# Patient Record
Sex: FEMALE | Race: WHITE | NOT HISPANIC OR LATINO | Employment: FULL TIME | URBAN - METROPOLITAN AREA
[De-identification: names, ages, dates, MRNs, and addresses within clinical notes are randomized per-mention and may not be internally consistent; named-entity substitution may affect disease eponyms.]

---

## 2017-07-24 ENCOUNTER — TELEPHONE (OUTPATIENT)
Dept: RADIOLOGY | Facility: HOSPITAL | Age: 47
End: 2017-07-24

## 2017-07-24 ENCOUNTER — OFFICE VISIT (OUTPATIENT)
Dept: OBSTETRICS AND GYNECOLOGY | Facility: CLINIC | Age: 47
End: 2017-07-24
Payer: COMMERCIAL

## 2017-07-24 VITALS
BODY MASS INDEX: 45.82 KG/M2 | WEIGHT: 249 LBS | DIASTOLIC BLOOD PRESSURE: 80 MMHG | SYSTOLIC BLOOD PRESSURE: 128 MMHG | HEIGHT: 62 IN

## 2017-07-24 DIAGNOSIS — Z01.419 PAP SMEAR, AS PART OF ROUTINE GYNECOLOGICAL EXAMINATION: ICD-10-CM

## 2017-07-24 DIAGNOSIS — N39.3 SUI (STRESS URINARY INCONTINENCE, FEMALE): ICD-10-CM

## 2017-07-24 DIAGNOSIS — G89.29 CHRONIC PELVIC PAIN IN FEMALE: ICD-10-CM

## 2017-07-24 DIAGNOSIS — Z86.010 PERSONAL HISTORY OF COLONIC POLYPS: ICD-10-CM

## 2017-07-24 DIAGNOSIS — Z12.39 BREAST CANCER SCREENING: ICD-10-CM

## 2017-07-24 DIAGNOSIS — R10.2 CHRONIC PELVIC PAIN IN FEMALE: ICD-10-CM

## 2017-07-24 DIAGNOSIS — Z01.419 WELL WOMAN EXAM WITH ROUTINE GYNECOLOGICAL EXAM: Primary | ICD-10-CM

## 2017-07-24 LAB
AMORPH CRY UR QL COMP ASSIST: ABNORMAL
BACTERIA #/AREA URNS AUTO: ABNORMAL /HPF
BILIRUB UR QL STRIP: NEGATIVE
CLARITY UR REFRACT.AUTO: ABNORMAL
COLOR UR AUTO: YELLOW
GLUCOSE UR QL STRIP: NEGATIVE
HGB UR QL STRIP: NEGATIVE
HYALINE CASTS UR QL AUTO: 0 /LPF
KETONES UR QL STRIP: NEGATIVE
LEUKOCYTE ESTERASE UR QL STRIP: NEGATIVE
MICROSCOPIC COMMENT: ABNORMAL
NITRITE UR QL STRIP: NEGATIVE
PH UR STRIP: 5 [PH] (ref 5–8)
PROT UR QL STRIP: ABNORMAL
RBC #/AREA URNS AUTO: 0 /HPF (ref 0–4)
SP GR UR STRIP: 1.02 (ref 1–1.03)
URN SPEC COLLECT METH UR: ABNORMAL
UROBILINOGEN UR STRIP-ACNC: NEGATIVE EU/DL
WBC #/AREA URNS AUTO: 0 /HPF (ref 0–5)

## 2017-07-24 PROCEDURE — 99999 PR PBB SHADOW E&M-EST. PATIENT-LVL IV: CPT | Mod: PBBFAC,,, | Performed by: OBSTETRICS & GYNECOLOGY

## 2017-07-24 PROCEDURE — 88175 CYTOPATH C/V AUTO FLUID REDO: CPT

## 2017-07-24 PROCEDURE — 87186 SC STD MICRODIL/AGAR DIL: CPT

## 2017-07-24 PROCEDURE — 87086 URINE CULTURE/COLONY COUNT: CPT

## 2017-07-24 PROCEDURE — 81001 URINALYSIS AUTO W/SCOPE: CPT

## 2017-07-24 PROCEDURE — 99386 PREV VISIT NEW AGE 40-64: CPT | Mod: S$GLB,,, | Performed by: OBSTETRICS & GYNECOLOGY

## 2017-07-24 PROCEDURE — 87088 URINE BACTERIA CULTURE: CPT

## 2017-07-24 PROCEDURE — 87077 CULTURE AEROBIC IDENTIFY: CPT

## 2017-07-24 RX ORDER — HYDROCHLOROTHIAZIDE 12.5 MG/1
12.5 TABLET ORAL
COMMUNITY
Start: 2017-06-05

## 2017-07-24 RX ORDER — BUPROPION HYDROCHLORIDE 300 MG/1
300 TABLET ORAL
COMMUNITY
Start: 2017-06-22 | End: 2018-11-15

## 2017-07-24 NOTE — PROGRESS NOTES
Subjective:       Patient ID: Radha Davlia is a 46 y.o. female.    Chief Complaint:  Gynecologic Exam      History of Present Illness  HPI  Presents for a well-woman exam, but also wants second opinion regarding pelvic pain.  Patient has a history of a BTL about 8 years ago, then underwent endometrial ablation 6 years ago for AUB.  States that last year, she started having cyclic episodes of pelvic pain.  She would get really bad pelvic cramps that radiated to her upper thighs and back.  It would last 1-2 weeks.  She tracked it, and noticed it occured in a cyclic, monthly fashion.  No bleeding or discharge during the pain episodes.  Was worked up by a doctor at [facility] and was told she had fluid in her endometrial cavity and hysterectomy was recommended.  She was also worked up by a urologist for stress urinary incontinence, and a sling procedure was planned as well.  The patient tried OCP's prior to this, but had some elevation in her bp on the pill.  She got very nervous about having surgery and wants a second opinion.  States she still has cyclic type cramping, but it is not severe as it was before.  She still leaks with cough or sneeze.  No dysuria or hematuria.   Last pap: thinks it was 2016 and normal  Last MMG: around 2016 and normal  Colonoscopy: several years ago and had a polyp;  Pt has a family history of colon cancer in her maternal grandfather, and her mother has had several colonic polyps removed    GYN & OB History  No LMP recorded. Patient has had an ablation.   Date of Last Pap: No result found    OB History    Para Term  AB Living   4 2 2   2 2   SAB TAB Ectopic Multiple Live Births   2              # Outcome Date GA Lbr Joe/2nd Weight Sex Delivery Anes PTL Lv   4 SAB            3 SAB            2 Term      CS-Unspec      1 Term      CS-Unspec             Review of Systems  Review of Systems   Constitutional: Negative for activity change, fatigue, fever and unexpected  weight change.   Gastrointestinal: Negative for abdominal pain, bloating, constipation, diarrhea, nausea and vomiting.   Endocrine: Negative for hair loss and hot flashes.   Genitourinary: Positive for pelvic pain and urinary incontinence (leaks with cough and sneeze). Negative for dyspareunia, dysuria, frequency, genital sores, hematuria, menorrhagia, menstrual problem, urgency, vaginal bleeding, vaginal discharge, vaginal pain, dysmenorrhea, postcoital bleeding and vaginal odor.   Skin:  Negative for rash and hair changes.   Hematological: Negative for adenopathy.   Breast: Negative for breast mass, breast pain, nipple discharge and skin changes          Objective:    Physical Exam:   Constitutional: She is oriented to person, place, and time. She appears well-developed and well-nourished. No distress.      Neck: Neck supple. No thyromegaly present.     Pulmonary/Chest: Right breast exhibits no inverted nipple, no mass, no nipple discharge, no skin change, no tenderness, no bleeding and no swelling. Left breast exhibits no inverted nipple, no mass, no nipple discharge, no skin change, no tenderness, no bleeding and no swelling. Breasts are symmetrical.        Abdominal: Soft. She exhibits no distension and no mass. There is no tenderness. There is no rebound and no guarding.     Genitourinary: Pelvic exam was performed with patient supine. There is no rash, tenderness, lesion or injury on the right labia. There is no rash, tenderness, lesion or injury on the left labia. Uterus is tender. Uterus is not deviated, not enlarged, not fixed, not hosting fibroids and not experiencing uterine prolapse. Cervix is normal. Right adnexum displays tenderness. Right adnexum displays no mass and no fullness. Left adnexum displays tenderness. Left adnexum displays no mass and no fullness. No erythema, tenderness, bleeding, rectocele or cystocele in the vagina. No foreign body in the vagina. No signs of injury around the vagina.  No vaginal discharge found. Cervix exhibits no motion tenderness, no discharge and no friability. Additional cervical findings: pap smear done  Genitourinary Comments: Mild, diffuse pelvic tenderness noted on exam  No cough stress test done today        Uterus Size: 6 cm      Lymphadenopathy:        Right: No inguinal adenopathy present.        Left: No inguinal adenopathy present.    Neurological: She is alert and oriented to person, place, and time.     Psychiatric: She has a normal mood and affect.          Assessment:        1. Well woman exam with routine gynecological exam    2. Chronic pelvic pain in female    3. Personal history of colonic polyps    4. Pap smear, as part of routine gynecological examination    5. Breast cancer screening    6. IVANNA (stress urinary incontinence, female)                Plan:      Radha was seen today for gynecologic exam.    Diagnoses and all orders for this visit:    Well woman exam with routine gynecological exam    Chronic pelvic pain in female  -     US Pelvis Complete Non OB; Future    Personal history of colonic polyps  -     Ambulatory Referral to Gastroenterology    Pap smear, as part of routine gynecological examination  -     Liquid-based pap smear, screening    Breast cancer screening  -     Mammo Digital Screening Bilat with CAD; Future    IVANNA (stress urinary incontinence, female)  -     Urinalysis  -     Urine culture    Reviewed my clinical suspicion for post ablation/tubal ligation syndrome as the cause of her cyclic type pelvic pain.  Explained that we could try non-estrogen containing hormonal management such as progesterone only OCP's or depo provera to see if she gets symptom relief.  Discussed that often, patients ultimately require hysterectomy with bilateral salpingectomy for symptom relief.  Patient hesitant to try depo provera b/c of concerns about weight gain.  As for her IVANNA, records were requested to see what sort of work-up was done.  If pt ultimately  opts for surgery, she will need to be evaluated by me with a full bladder for a cough stress test.  Discussed Kegel exercises with the patient.  Reviewed updated recommendations for pap smears (every 3 years) in low risk patients.   Recommend annual pelvic exams.  Reviewed recommendations for annual CBE and annual MMG.  Will call pt with results of above and see how she wants to proceed.

## 2017-07-25 ENCOUNTER — HOSPITAL ENCOUNTER (OUTPATIENT)
Dept: RADIOLOGY | Facility: HOSPITAL | Age: 47
Discharge: HOME | End: 2017-07-25
Attending: OBSTETRICS & GYNECOLOGY
Payer: COMMERCIAL

## 2017-07-25 VITALS — BODY MASS INDEX: 45.8 KG/M2 | WEIGHT: 248.88 LBS | HEIGHT: 62 IN

## 2017-07-25 DIAGNOSIS — G89.29 CHRONIC PELVIC PAIN IN FEMALE: ICD-10-CM

## 2017-07-25 DIAGNOSIS — Z12.39 BREAST CANCER SCREENING: ICD-10-CM

## 2017-07-25 DIAGNOSIS — R10.2 CHRONIC PELVIC PAIN IN FEMALE: ICD-10-CM

## 2017-07-25 PROCEDURE — 77067 SCR MAMMO BI INCL CAD: CPT | Mod: TC

## 2017-07-25 PROCEDURE — 76830 TRANSVAGINAL US NON-OB: CPT | Mod: 26,,, | Performed by: RADIOLOGY

## 2017-07-25 PROCEDURE — 77063 BREAST TOMOSYNTHESIS BI: CPT | Mod: 26,,, | Performed by: RADIOLOGY

## 2017-07-25 PROCEDURE — 76856 US EXAM PELVIC COMPLETE: CPT | Mod: TC,PO

## 2017-07-25 PROCEDURE — 76856 US EXAM PELVIC COMPLETE: CPT | Mod: 26,,, | Performed by: RADIOLOGY

## 2017-07-25 PROCEDURE — 77067 SCR MAMMO BI INCL CAD: CPT | Mod: 26,,, | Performed by: RADIOLOGY

## 2017-07-27 LAB — BACTERIA UR CULT: NORMAL

## 2017-07-28 ENCOUNTER — PATIENT MESSAGE (OUTPATIENT)
Dept: OBSTETRICS AND GYNECOLOGY | Facility: CLINIC | Age: 47
End: 2017-07-28

## 2017-07-28 DIAGNOSIS — N30.00 ACUTE CYSTITIS WITHOUT HEMATURIA: Primary | ICD-10-CM

## 2017-07-28 RX ORDER — SULFAMETHOXAZOLE AND TRIMETHOPRIM 800; 160 MG/1; MG/1
1 TABLET ORAL 2 TIMES DAILY
Qty: 6 TABLET | Refills: 0 | Status: SHIPPED | OUTPATIENT
Start: 2017-07-28 | End: 2017-07-31

## 2017-10-04 ENCOUNTER — TELEPHONE (OUTPATIENT)
Dept: FAMILY MEDICINE | Facility: CLINIC | Age: 47
End: 2017-10-04

## 2017-10-04 DIAGNOSIS — Z86.010 HISTORY OF COLON POLYPS: Primary | ICD-10-CM

## 2017-10-04 NOTE — TELEPHONE ENCOUNTER
She wants it in [facility].I have signed for the following orders AND/OR meds.  Please call the patient and ask the patient to schedule the testing AND/OR inform about any medications that were sent.      Orders Placed This Encounter   Procedures    Case request GI: COLONOSCOPY     Order Specific Question:   Pre-op Diagnosis     Answer:   History of colon polyps [113600]     Order Specific Question:   CPT Code:     Answer:   IN INTERVAL >=3 YRS PATIENT'S LAST COLONOSCOPY DOCUMENTED [0529F]     Order Specific Question:   Case Referring Provider     Answer:   ROSA DIAZ [[id number]]

## 2017-10-04 NOTE — TELEPHONE ENCOUNTER
----- Message from Pita Young sent at 10/4/2017 10:38 AM CDT -----  Contact: pita Toney I please get an order for pt to have a colon.for hx of colon polyps.  Please advise

## 2017-10-10 ENCOUNTER — PATIENT MESSAGE (OUTPATIENT)
Dept: FAMILY MEDICINE | Facility: CLINIC | Age: 47
End: 2017-10-10

## 2018-01-02 ENCOUNTER — TELEPHONE (OUTPATIENT)
Dept: OBSTETRICS AND GYNECOLOGY | Facility: CLINIC | Age: 48
End: 2018-01-02

## 2018-01-02 NOTE — TELEPHONE ENCOUNTER
----- Message from Geraldo Banks sent at 1/2/2018 11:55 AM CST -----  Contact: Pt   States she's calling rg having lumps in vaginal area and wants to discuss and can be reached at [phone]//thanks/dbw

## 2018-01-02 NOTE — TELEPHONE ENCOUNTER
Pt states that she has bumps on her vagina that are itching. Pt requesting to be seen. Scheduled pt to see Sandra Yun Np 1/4/17 at 1045.

## 2018-01-02 NOTE — TELEPHONE ENCOUNTER
----- Message from Radha Hodgson sent at 1/2/2018 12:32 PM CST -----  Contact: pt   Pt returning nurses call,,, please call pt back at [phone] (m)

## 2018-09-20 ENCOUNTER — TELEPHONE (OUTPATIENT)
Dept: OBSTETRICS AND GYNECOLOGY | Facility: CLINIC | Age: 48
End: 2018-09-20

## 2018-09-20 DIAGNOSIS — Z12.39 BREAST CANCER SCREENING: Primary | ICD-10-CM

## 2018-09-20 NOTE — TELEPHONE ENCOUNTER
----- Message from Mayte Eaton sent at 9/20/2018 12:28 PM CDT -----  Contact: PT   Pt need orders in for Mammo.    [phone] (home)

## 2018-09-20 NOTE — TELEPHONE ENCOUNTER
mammogram order placed per written order guideline. Attempted to call pt, no answer, unable to leave message. Will try again later.

## 2018-09-24 ENCOUNTER — OFFICE VISIT (OUTPATIENT)
Dept: OBSTETRICS AND GYNECOLOGY | Facility: CLINIC | Age: 48
End: 2018-09-24
Payer: COMMERCIAL

## 2018-09-24 ENCOUNTER — HOSPITAL ENCOUNTER (OUTPATIENT)
Dept: RADIOLOGY | Facility: HOSPITAL | Age: 48
Discharge: HOME | End: 2018-09-24
Attending: OBSTETRICS & GYNECOLOGY
Payer: COMMERCIAL

## 2018-09-24 VITALS
WEIGHT: 255.5 LBS | HEIGHT: 62 IN | DIASTOLIC BLOOD PRESSURE: 86 MMHG | SYSTOLIC BLOOD PRESSURE: 136 MMHG | BODY MASS INDEX: 47.02 KG/M2

## 2018-09-24 VITALS — WEIGHT: 255 LBS | HEIGHT: 62 IN | BODY MASS INDEX: 46.93 KG/M2

## 2018-09-24 DIAGNOSIS — N30.01 ACUTE CYSTITIS WITH HEMATURIA: ICD-10-CM

## 2018-09-24 DIAGNOSIS — B37.31 VULVOVAGINAL CANDIDIASIS: ICD-10-CM

## 2018-09-24 DIAGNOSIS — Z12.11 COLON CANCER SCREENING: ICD-10-CM

## 2018-09-24 DIAGNOSIS — L90.0 LICHEN SCLEROSUS: ICD-10-CM

## 2018-09-24 DIAGNOSIS — Z12.39 BREAST CANCER SCREENING: ICD-10-CM

## 2018-09-24 DIAGNOSIS — L29.2 VULVAR PRURITUS: ICD-10-CM

## 2018-09-24 DIAGNOSIS — Z01.419 WELL WOMAN EXAM WITH ROUTINE GYNECOLOGICAL EXAM: Primary | ICD-10-CM

## 2018-09-24 PROCEDURE — 3075F SYST BP GE 130 - 139MM HG: CPT | Mod: CPTII,S$GLB,, | Performed by: OBSTETRICS & GYNECOLOGY

## 2018-09-24 PROCEDURE — 99999 PR PBB SHADOW E&M-EST. PATIENT-LVL III: CPT | Mod: PBBFAC,,, | Performed by: OBSTETRICS & GYNECOLOGY

## 2018-09-24 PROCEDURE — 3079F DIAST BP 80-89 MM HG: CPT | Mod: CPTII,S$GLB,, | Performed by: OBSTETRICS & GYNECOLOGY

## 2018-09-24 PROCEDURE — 77063 BREAST TOMOSYNTHESIS BI: CPT | Mod: TC,PO

## 2018-09-24 PROCEDURE — 99396 PREV VISIT EST AGE 40-64: CPT | Mod: 25,S$GLB,, | Performed by: OBSTETRICS & GYNECOLOGY

## 2018-09-24 PROCEDURE — 87086 URINE CULTURE/COLONY COUNT: CPT

## 2018-09-24 PROCEDURE — 87210 SMEAR WET MOUNT SALINE/INK: CPT | Mod: QW,S$GLB,, | Performed by: OBSTETRICS & GYNECOLOGY

## 2018-09-24 PROCEDURE — 87088 URINE BACTERIA CULTURE: CPT

## 2018-09-24 PROCEDURE — 77063 BREAST TOMOSYNTHESIS BI: CPT | Mod: 26,,, | Performed by: RADIOLOGY

## 2018-09-24 PROCEDURE — 87077 CULTURE AEROBIC IDENTIFY: CPT

## 2018-09-24 PROCEDURE — 87186 SC STD MICRODIL/AGAR DIL: CPT

## 2018-09-24 PROCEDURE — 77067 SCR MAMMO BI INCL CAD: CPT | Mod: 26,,, | Performed by: RADIOLOGY

## 2018-09-24 RX ORDER — CLOBETASOL PROPIONATE 0.5 MG/G
OINTMENT TOPICAL DAILY PRN
Qty: 60 G | Refills: 3 | Status: SHIPPED | OUTPATIENT
Start: 2018-09-24 | End: 2018-10-24

## 2018-09-24 RX ORDER — FLUCONAZOLE 150 MG/1
150 TABLET ORAL ONCE
Qty: 1 TABLET | Refills: 0 | Status: SHIPPED | OUTPATIENT
Start: 2018-09-24 | End: 2018-09-24

## 2018-09-24 RX ORDER — SULFAMETHOXAZOLE AND TRIMETHOPRIM 800; 160 MG/1; MG/1
1 TABLET ORAL 2 TIMES DAILY
Qty: 6 TABLET | Refills: 0 | Status: SHIPPED | OUTPATIENT
Start: 2018-09-24 | End: 2018-09-27

## 2018-09-24 NOTE — PROGRESS NOTES
Subjective:       Patient ID: Radha Davila is a 48 y.o. female.    Chief Complaint:  Annual Exam      History of Present Illness  HPI  Presents for well-woman exam.  Complains of dysuria and midline pelvic discomfort when voiding along with vulvovaginal itching.  Patient with known lichen sclerosus by biopsy, and uses clobetasol ointment sparingly as needed for flare-ups.  Needs refill.  She has a hx of endometrial ablation and BTL.  She was having issues with severe cyclic pelvic pain, but that has pretty much resolved.     Last pap: 2017: normal  Doing screening MMG today: no breast compaints  Needs referral for screening colonoscopy.    GYN & OB History  No LMP recorded. Patient has had an ablation.   Date of Last Pap: 2017    OB History    Para Term  AB Living   4 2 2   2 2   SAB TAB Ectopic Multiple Live Births   2              # Outcome Date GA Lbr Joe/2nd Weight Sex Delivery Anes PTL Lv   4 SAB            3 SAB            2 Term      CS-Unspec      1 Term      CS-Unspec             Review of Systems  Review of Systems   Constitutional: Negative for activity change, fatigue, fever and unexpected weight change.   Gastrointestinal: Negative for abdominal pain, bloating, constipation, diarrhea, nausea and vomiting.   Endocrine: Negative for hair loss and hot flashes.   Genitourinary: Positive for dysuria, frequency, pelvic pain (midline pelvic discomfort when voiding), vaginal pain (itching) and vaginal odor. Negative for dyspareunia, genital sores, hematuria, menorrhagia, menstrual problem, urgency, vaginal bleeding, vaginal discharge, dysmenorrhea and postcoital bleeding.   Skin:  Negative for rash and hair changes.   Hematological: Negative for adenopathy.   Breast: Negative for breast mass, breast pain, nipple discharge and skin changes          Objective:    Physical Exam:   Constitutional: She is oriented to person, place, and time. She appears well-developed and well-nourished.  No distress.      Neck: Neck supple. No thyromegaly present.     Pulmonary/Chest: Right breast exhibits no inverted nipple, no mass, no nipple discharge, no skin change, no tenderness, no bleeding and no swelling. Left breast exhibits no inverted nipple, no mass, no nipple discharge, no skin change, no tenderness, no bleeding and no swelling. Breasts are symmetrical.        Abdominal: Soft. She exhibits no distension and no mass. There is tenderness in the suprapubic area. There is no rebound and no guarding.   obese     Genitourinary: Pelvic exam was performed with patient supine. There is rash (erythematous maculopapular rash with satellite lesions c/w yeast) on the right labia. There is no tenderness, lesion or injury on the right labia. There is rash on the left labia. There is no tenderness, lesion or injury on the left labia. Uterus is not deviated, not enlarged, not fixed, not tender, not hosting fibroids and not experiencing uterine prolapse. Cervix is normal. Right adnexum displays no mass, no tenderness and no fullness. Left adnexum displays no mass, no tenderness and no fullness. There is erythema in the vagina. No tenderness, bleeding, rectocele or cystocele in the vagina. No foreign body in the vagina. No signs of injury around the vagina. No vaginal discharge found. Cervix exhibits no motion tenderness, no discharge and no friability.        Uterus Size: 6 cm      Lymphadenopathy:        Right: No inguinal adenopathy present.        Left: No inguinal adenopathy present.    Neurological: She is alert and oriented to person, place, and time.     Psychiatric: She has a normal mood and affect.        urine dip: 2+ protein, + blood  Wet prep: yeast noted  Assessment:        1. Well woman exam with routine gynecological exam    2. Lichen sclerosus    3. Acute cystitis with hematuria    4. Vulvar pruritus    5. Vulvovaginal candidiasis    6. Colon cancer screening                Plan:      Radha was seen today  for annual exam.    Diagnoses and all orders for this visit:    Well woman exam with routine gynecological exam    Lichen sclerosus  -     clobetasol 0.05% (TEMOVATE) 0.05 % Oint; Apply topically daily as needed. For vulvar irritation    Acute cystitis with hematuria  -     sulfamethoxazole-trimethoprim 800-160mg (BACTRIM DS) 800-160 mg Tab; Take 1 tablet by mouth 2 (two) times daily. for 3 days  -     Urine culture    Vulvar pruritus  -     clobetasol 0.05% (TEMOVATE) 0.05 % Oint; Apply topically daily as needed. For vulvar irritation    Vulvovaginal candidiasis  -     fluconazole (DIFLUCAN) 150 MG Tab; Take 1 tablet (150 mg total) by mouth once. for 1 dose    Colon cancer screening  -     Case request GI: COLONOSCOPY    Reviewed updated recommendations for pap smears (every 3 years) in low risk patients.   Recommend annual pelvic exams.  Reviewed recommendations for annual CBE and annual MMG.  RTC 1 year or sooner prn.

## 2018-09-26 ENCOUNTER — PATIENT MESSAGE (OUTPATIENT)
Dept: OBSTETRICS AND GYNECOLOGY | Facility: CLINIC | Age: 48
End: 2018-09-26

## 2018-09-27 ENCOUNTER — LAB VISIT (OUTPATIENT)
Dept: LAB | Facility: HOSPITAL | Age: 48
End: 2018-09-27
Attending: INTERNAL MEDICINE
Payer: COMMERCIAL

## 2018-09-27 ENCOUNTER — OFFICE VISIT (OUTPATIENT)
Dept: RHEUMATOLOGY | Facility: CLINIC | Age: 48
End: 2018-09-27
Payer: COMMERCIAL

## 2018-09-27 ENCOUNTER — TELEPHONE (OUTPATIENT)
Dept: OBSTETRICS AND GYNECOLOGY | Facility: CLINIC | Age: 48
End: 2018-09-27

## 2018-09-27 ENCOUNTER — TELEPHONE (OUTPATIENT)
Dept: RHEUMATOLOGY | Facility: CLINIC | Age: 48
End: 2018-09-27

## 2018-09-27 VITALS
HEIGHT: 62 IN | DIASTOLIC BLOOD PRESSURE: 100 MMHG | HEART RATE: 88 BPM | SYSTOLIC BLOOD PRESSURE: 152 MMHG | BODY MASS INDEX: 46.38 KG/M2 | WEIGHT: 252 LBS

## 2018-09-27 DIAGNOSIS — E06.3 HYPOTHYROIDISM DUE TO HASHIMOTO'S THYROIDITIS: ICD-10-CM

## 2018-09-27 DIAGNOSIS — M35.9 UNDIFFERENTIATED CONNECTIVE TISSUE DISEASE: ICD-10-CM

## 2018-09-27 DIAGNOSIS — N30.01 ACUTE CYSTITIS WITH HEMATURIA: Primary | ICD-10-CM

## 2018-09-27 DIAGNOSIS — R06.09 DYSPNEA ON EXERTION: Primary | ICD-10-CM

## 2018-09-27 DIAGNOSIS — M35.9 UNDIFFERENTIATED CONNECTIVE TISSUE DISEASE: Primary | ICD-10-CM

## 2018-09-27 DIAGNOSIS — E03.8 HYPOTHYROIDISM DUE TO HASHIMOTO'S THYROIDITIS: ICD-10-CM

## 2018-09-27 DIAGNOSIS — B99.9 RECURRENT INFECTIONS: ICD-10-CM

## 2018-09-27 DIAGNOSIS — N30.01 ACUTE CYSTITIS WITH HEMATURIA: ICD-10-CM

## 2018-09-27 LAB
ALBUMIN SERPL BCP-MCNC: 4.1 G/DL
ALP SERPL-CCNC: 106 U/L
ALT SERPL W/O P-5'-P-CCNC: 18 U/L
ANION GAP SERPL CALC-SCNC: 8 MMOL/L
AST SERPL-CCNC: 14 U/L
BACTERIA UR CULT: NORMAL
BASOPHILS # BLD AUTO: 0.04 K/UL
BASOPHILS NFR BLD: 0.4 %
BILIRUB SERPL-MCNC: 0.4 MG/DL
BILIRUB UR QL STRIP: NEGATIVE
BUN SERPL-MCNC: 11 MG/DL
C3 SERPL-MCNC: 159 MG/DL
C4 SERPL-MCNC: 47 MG/DL
CALCIUM SERPL-MCNC: 9.9 MG/DL
CCP AB SER IA-ACNC: <0.5 U/ML
CHLORIDE SERPL-SCNC: 108 MMOL/L
CK SERPL-CCNC: 73 U/L
CLARITY UR: CLEAR
CO2 SERPL-SCNC: 23 MMOL/L
COLOR UR: YELLOW
CREAT SERPL-MCNC: 0.8 MG/DL
CREAT UR-MCNC: 136 MG/DL
CRP SERPL-MCNC: 3.8 MG/L
DIFFERENTIAL METHOD: NORMAL
EOSINOPHIL # BLD AUTO: 0.5 K/UL
EOSINOPHIL NFR BLD: 4.6 %
ERYTHROCYTE [DISTWIDTH] IN BLOOD BY AUTOMATED COUNT: 13.7 %
ERYTHROCYTE [SEDIMENTATION RATE] IN BLOOD BY WESTERGREN METHOD: 12 MM/HR
EST. GFR  (AFRICAN AMERICAN): >60 ML/MIN/1.73 M^2
EST. GFR  (NON AFRICAN AMERICAN): >60 ML/MIN/1.73 M^2
GLUCOSE SERPL-MCNC: 101 MG/DL
GLUCOSE UR QL STRIP: NEGATIVE
HCT VFR BLD AUTO: 42 %
HGB BLD-MCNC: 14.2 G/DL
HGB UR QL STRIP: ABNORMAL
IGA SERPL-MCNC: 261 MG/DL
IGE SERPL-ACNC: <35 IU/ML
IGG SERPL-MCNC: 1127 MG/DL
IGM SERPL-MCNC: 123 MG/DL
KETONES UR QL STRIP: NEGATIVE
LEUKOCYTE ESTERASE UR QL STRIP: NEGATIVE
LYMPHOCYTES # BLD AUTO: 2.2 K/UL
LYMPHOCYTES NFR BLD: 21.7 %
MCH RBC QN AUTO: 28.5 PG
MCHC RBC AUTO-ENTMCNC: 33.8 G/DL
MCV RBC AUTO: 84 FL
MONOCYTES # BLD AUTO: 0.7 K/UL
MONOCYTES NFR BLD: 7.2 %
NEUTROPHILS # BLD AUTO: 6.7 K/UL
NEUTROPHILS NFR BLD: 66.1 %
NITRITE UR QL STRIP: NEGATIVE
PH UR STRIP: 6 [PH] (ref 5–8)
PLATELET # BLD AUTO: 283 K/UL
PMV BLD AUTO: 10.2 FL
POTASSIUM SERPL-SCNC: 4.3 MMOL/L
PROT SERPL-MCNC: 7.8 G/DL
PROT UR QL STRIP: ABNORMAL
PROT UR-MCNC: 29 MG/DL
PROT/CREAT UR: 0.21 MG/G{CREAT}
RBC # BLD AUTO: 4.98 M/UL
RHEUMATOID FACT SERPL-ACNC: <10 IU/ML
SODIUM SERPL-SCNC: 139 MMOL/L
SP GR UR STRIP: >=1.03 (ref 1–1.03)
URN SPEC COLLECT METH UR: ABNORMAL
WBC # BLD AUTO: 10.06 K/UL

## 2018-09-27 PROCEDURE — 86161 COMPLEMENT/FUNCTION ACTIVITY: CPT

## 2018-09-27 PROCEDURE — 87088 URINE BACTERIA CULTURE: CPT

## 2018-09-27 PROCEDURE — 82085 ASSAY OF ALDOLASE: CPT

## 2018-09-27 PROCEDURE — 86334 IMMUNOFIX E-PHORESIS SERUM: CPT

## 2018-09-27 PROCEDURE — 83516 IMMUNOASSAY NONANTIBODY: CPT | Mod: 59

## 2018-09-27 PROCEDURE — 84165 PROTEIN E-PHORESIS SERUM: CPT | Mod: 26,,, | Performed by: PATHOLOGY

## 2018-09-27 PROCEDURE — 86431 RHEUMATOID FACTOR QUANT: CPT

## 2018-09-27 PROCEDURE — 81003 URINALYSIS AUTO W/O SCOPE: CPT | Mod: PO

## 2018-09-27 PROCEDURE — 86235 NUCLEAR ANTIGEN ANTIBODY: CPT

## 2018-09-27 PROCEDURE — 84165 PROTEIN E-PHORESIS SERUM: CPT

## 2018-09-27 PROCEDURE — 86235 NUCLEAR ANTIGEN ANTIBODY: CPT | Mod: 59

## 2018-09-27 PROCEDURE — 85025 COMPLETE CBC W/AUTO DIFF WBC: CPT | Mod: PO

## 2018-09-27 PROCEDURE — 3077F SYST BP >= 140 MM HG: CPT | Mod: CPTII,S$GLB,, | Performed by: INTERNAL MEDICINE

## 2018-09-27 PROCEDURE — 86200 CCP ANTIBODY: CPT

## 2018-09-27 PROCEDURE — 82570 ASSAY OF URINE CREATININE: CPT

## 2018-09-27 PROCEDURE — 86355 B CELLS TOTAL COUNT: CPT

## 2018-09-27 PROCEDURE — 85651 RBC SED RATE NONAUTOMATED: CPT | Mod: PO

## 2018-09-27 PROCEDURE — 80053 COMPREHEN METABOLIC PANEL: CPT | Mod: PO

## 2018-09-27 PROCEDURE — 3080F DIAST BP >= 90 MM HG: CPT | Mod: CPTII,S$GLB,, | Performed by: INTERNAL MEDICINE

## 2018-09-27 PROCEDURE — 83520 IMMUNOASSAY QUANT NOS NONAB: CPT | Mod: 59

## 2018-09-27 PROCEDURE — 86360 T CELL ABSOLUTE COUNT/RATIO: CPT

## 2018-09-27 PROCEDURE — 86160 COMPLEMENT ANTIGEN: CPT | Mod: 59

## 2018-09-27 PROCEDURE — 82785 ASSAY OF IGE: CPT

## 2018-09-27 PROCEDURE — 82550 ASSAY OF CK (CPK): CPT | Mod: PO

## 2018-09-27 PROCEDURE — 87086 URINE CULTURE/COLONY COUNT: CPT

## 2018-09-27 PROCEDURE — 86334 IMMUNOFIX E-PHORESIS SERUM: CPT | Mod: 26,,, | Performed by: PATHOLOGY

## 2018-09-27 PROCEDURE — 99999 PR PBB SHADOW E&M-EST. PATIENT-LVL III: CPT | Mod: PBBFAC,,, | Performed by: INTERNAL MEDICINE

## 2018-09-27 PROCEDURE — 87186 SC STD MICRODIL/AGAR DIL: CPT

## 2018-09-27 PROCEDURE — 86359 T CELLS TOTAL COUNT: CPT

## 2018-09-27 PROCEDURE — 3008F BODY MASS INDEX DOCD: CPT | Mod: CPTII,S$GLB,, | Performed by: INTERNAL MEDICINE

## 2018-09-27 PROCEDURE — 86162 COMPLEMENT TOTAL (CH50): CPT

## 2018-09-27 PROCEDURE — 87077 CULTURE AEROBIC IDENTIFY: CPT

## 2018-09-27 PROCEDURE — 86160 COMPLEMENT ANTIGEN: CPT

## 2018-09-27 PROCEDURE — 86357 NK CELLS TOTAL COUNT: CPT

## 2018-09-27 PROCEDURE — 82784 ASSAY IGA/IGD/IGG/IGM EACH: CPT | Mod: 59

## 2018-09-27 PROCEDURE — 86140 C-REACTIVE PROTEIN: CPT

## 2018-09-27 PROCEDURE — 83520 IMMUNOASSAY QUANT NOS NONAB: CPT

## 2018-09-27 PROCEDURE — 86038 ANTINUCLEAR ANTIBODIES: CPT

## 2018-09-27 PROCEDURE — 36415 COLL VENOUS BLD VENIPUNCTURE: CPT | Mod: PO

## 2018-09-27 PROCEDURE — 99205 OFFICE O/P NEW HI 60 MIN: CPT | Mod: S$GLB,,, | Performed by: INTERNAL MEDICINE

## 2018-09-27 RX ORDER — LEVOTHYROXINE SODIUM 25 UG/1
25 TABLET ORAL DAILY
Qty: 30 TABLET | Refills: 11 | Status: SHIPPED | OUTPATIENT
Start: 2018-09-27 | End: 2018-11-15

## 2018-09-27 RX ORDER — AMOXICILLIN AND CLAVULANATE POTASSIUM 875; 125 MG/1; MG/1
1 TABLET, FILM COATED ORAL EVERY 12 HOURS
Qty: 14 TABLET | Refills: 0 | Status: SHIPPED | OUTPATIENT
Start: 2018-09-27 | End: 2018-10-04

## 2018-09-27 NOTE — ASSESSMENT & PLAN NOTE
Recurrent infections associated with mucocutaneous ulcers, cystitis and nasopharyngeal it is.  Check immunoglobulin levels to rule out any underlying immunodeficiency disorder.

## 2018-09-27 NOTE — ASSESSMENT & PLAN NOTE
Hypothyroidism associated with Hashimoto's thyroiditis with severe chronic fatigue and inability to lose weight.  Start low-dose levothyroxine 25 mcg once daily.  Advised all precautions before taking thyroxine tablets

## 2018-09-27 NOTE — ASSESSMENT & PLAN NOTE
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  Check serologies to see whether her autoimmune disease have evolved in the last 8 years.  Consider initiation of hydroxychloroquine therapy once serologies return.

## 2018-09-27 NOTE — ASSESSMENT & PLAN NOTE
Have completed antibiotics given by her primary care physician.  She still has dysuria.  Repeat UA with urine culture.

## 2018-09-27 NOTE — TELEPHONE ENCOUNTER
Jennifer Retana Staff               Ct of chest scheduled 9-28                                                              Good Morning,                                                                                This case is pending through [facility] and needs a peer to peer . Please call :[phone] for a peer to peer; the patient's demographics can be used for identification. Please IM or message for urgent questions and concerns.                                                                Jennifer Heath

## 2018-09-27 NOTE — PROGRESS NOTES
RHEUMATOLOGY CLINIC INITIAL VISIT  Chief complaints:-  I am here to get checked for any autoimmune disease since I get recurrent infections and pain all over the body.    HPI:-  Radha Christiansen a 48 y.o. pleasant female comes in for an initial visit with above chief complaints.  She reports having recurrent infections in the past 5 years associated with tennis over angiitis, bladder infections, mucocutaneous ulcers associated with severe fatigue, joint pains, muscle pain and weakness.  She was diagnosed with some autoimmune disorder several years ago which she is not sure.  She followed up with the rheumatologist in [place] at that time and was told not to worry about autoimmune disease since it was very mild.  She was also diagnosed with multiple other problems as reported in the past medical history without any clear underlying cause in the last several years.  She has history of photosensitive malar rash, sicca syndrome, recurrent mucocutaneous ulcers.  She denies any history of treatment resistant headaches, seizures or psychosis.  She complains of both mechanical and inflammatory type of pain over her several small and large joints.  Review of Systems   Constitutional: Positive for malaise/fatigue. Negative for chills and fever.   HENT: Negative for congestion and sore throat.    Eyes: Negative for blurred vision and redness.   Respiratory: Positive for shortness of breath. Negative for cough.    Cardiovascular: Negative for chest pain and leg swelling.   Gastrointestinal: Negative for abdominal pain.   Genitourinary: Positive for dysuria.   Musculoskeletal: Positive for back pain, joint pain, myalgias and neck pain. Negative for falls.   Skin: Negative for rash.   Neurological: Positive for tingling and sensory change. Negative for headaches.   Endo/Heme/Allergies: Does not bruise/bleed easily.   Psychiatric/Behavioral: Positive for depression.  "Negative for memory loss. The patient is nervous/anxious and has insomnia.        Past Medical History:   Diagnosis Date    Anemia     Anxiety     Fibrocystic breast     Hypertension     Infertility, female     Insulin resistance     Lichen sclerosus     Molar pregnancy     Multinodular goiter 2012    Thyroid disease     Ulcer        Past Surgical History:   Procedure Laterality Date     SECTION      x2    CHOLECYSTECTOMY      COLONOSCOPY W/ POLYPECTOMY  4/3/2012    DILATION AND CURETTAGE OF UTERUS      x2    ENDOMETRIAL ABLATION      ND UPPER GI ENDOSCOPY,BIOPSY  4/3/2012         TUBAL LIGATION          Social History     Tobacco Use    Smoking status: Never Smoker    Smokeless tobacco: Never Used   Substance Use Topics    Alcohol use: No    Drug use: No       Family History   Problem Relation Age of Onset    Cancer Maternal Grandfather         cancer    Colon cancer Maternal Grandfather     Cancer Paternal Grandfather     Colon polyps Mother     Breast cancer Neg Hx     Ovarian cancer Neg Hx        Review of patient's allergies indicates:  No Known Allergies        Physical examination:-    Vitals:    18 0831   BP: (!) 152/100   Pulse: 88   Weight: 114.3 kg (251 lb 15.8 oz)   Height: 5' 2" (1.575 m)   PainSc:   3       Physical Exam   Constitutional: She is oriented to person, place, and time and well-developed, well-nourished, and in no distress. No distress.   HENT:   Head: Normocephalic.   Mouth/Throat: Oropharynx is clear and moist.   Eyes: Conjunctivae and EOM are normal. Pupils are equal, round, and reactive to light.   Neck: Normal range of motion. Neck supple.   Cardiovascular: Normal rate and intact distal pulses.   Pulmonary/Chest: Effort normal. No respiratory distress.   Abdominal: Soft. There is no tenderness.   Musculoskeletal:   Bilateral puffy fingers without any associated synovitis or tenderness.  Mild skin thickening present distal to MCP joints.  " Two telangiectasias present over her nose and the right eyebrow.  No significant synovitis detected anywhere.   Neurological: She is alert and oriented to person, place, and time. No cranial nerve deficit.   Skin: Skin is warm. No rash noted. There is erythema.   Psychiatric: Mood and affect normal.   Nursing note and vitals reviewed.      Labs:-  Results for GIOVANNI DE JESUS (MRN [id number]) as of 9/27/2018 17:11   Ref. Range 4/22/2010 10:26 6/9/2010 10:47 7/26/2010 16:40   BENITO Latest Ref Range: Neg <1:160  Pos, Titer to follow (A) Pos, Titer to follow (A)    BENITO HEP-2 Titer Latest Ref Range: Neg <1:160 titer Pos 1:320 (A) Pos 1:320 (A)    BENITO Hep-2 Pattern Unknown Speckled (A) Speckled (A)    Anti-SSA Antibody Latest Ref Range: <20 EU 7.34 7.73    Anti-SSA Interpretation Latest Ref Range: Negative  Negative Negative    Anti-SSB Antibody Latest Ref Range: <20 EU 2.82 2.69    Anti-SSB Interpretation Latest Ref Range: Negative  Negative Negative    ds DNA Ab Latest Ref Range: Negative Titer Negative Negative    Anti Sm Antibody Latest Ref Range: <20 EU 1.76 4.70    Anti-Sm Interpretation Latest Ref Range: Negative  Negative Negative    Anti Sm/RNP Antibody Latest Ref Range: <20 EU 28.60 26.80    Anti-Sm/RNP Interpretation Latest Ref Range: Negative  Positive (A) Positive (A)    Complement (C-3) Latest Ref Range: 50 - 180 mg/dl   122   Complement (C-4) Latest Ref Range: 11 - 44 mg/dl   39   Rheumatoid Factor Latest Ref Range: 0 - 15 IU/ml 10              Medication List           Accurate as of 9/27/18  5:07 PM. If you have any questions, ask your nurse or doctor.               START taking these medications    amoxicillin-clavulanate 875-125mg 875-125 mg per tablet  Commonly known as:  AUGMENTIN  Take 1 tablet by mouth every 12 (twelve) hours. for 7 days  Started by:  Iliana Sam MD     levothyroxine 25 MCG tablet  Commonly known as:  SYNTHROID  Take 1 tablet (25 mcg total) by mouth once daily.  Started by:   Mazin Shipman MD        CONTINUE taking these medications    ALPRAZolam 0.5 MG tablet  Commonly known as:  XANAX     brompheniramin-phenylephrin-DM 4-7.5-15 mg/5 mL Liqd  Commonly known as:  ALA-HIST DM  TAKE ONE TEASPOONFUL BY MOUTH EVERY 4 HOURS     buPROPion 300 MG 24 hr tablet  Commonly known as:  WELLBUTRIN XL     clobetasol 0.05% 0.05 % Oint  Commonly known as:  TEMOVATE  Apply topically daily as needed. For vulvar irritation     fluticasone 50 mcg/actuation nasal spray  Commonly known as:  FLONASE  2 sprays by Each Nare route once daily.     hydroCHLOROthiazide 12.5 MG Tab  Commonly known as:  HYDRODIURIL     metoprolol succinate 50 MG 24 hr tablet  Commonly known as:  TOPROL-XL  TAKE 1 TABLET BY MOUTH EVERY DAY     omeprazole 40 MG capsule  Commonly known as:  PRILOSEC  TAKE 1 CAPSULE BY MOUTH EVERY MORNING     SITagliptan-metformin 50-1,000 mg Tm24  Commonly known as:  JANUMET XR     sulfamethoxazole-trimethoprim 800-160mg 800-160 mg Tab  Commonly known as:  BACTRIM DS  Take 1 tablet by mouth 2 (two) times daily. for 3 days           Where to Get Your Medications      These medications were sent to [facility]  [address]  [address] [address] [place] [address]    Phone:  [phone]   · amoxicillin-clavulanate 875-125mg 875-125 mg per tablet  · levothyroxine 25 MCG tablet         Assessment/Plans:-  Undifferentiated connective tissue disease  Undifferentiated connective tissue disease with positive BENITO, positive RNP antibody, photosensitive malar rash, multiple telangiectasias, arthralgias, myalgias, skin thickening over fingertips, dry eyes, dry mouth, fatigue.  Check serologies to see whether her autoimmune disease have evolved in the last 8 years.  Consider initiation of hydroxychloroquine therapy once serologies return.  - BENITO; Standing  - C3 complement; Standing  - C4 complement; Standing  - CBC auto differential; Standing  - Comprehensive metabolic panel; Standing  -  Sedimentation rate; Standing  - C-reactive protein; Standing  - Protein / creatinine ratio, urine; Standing  - Cyclic citrul peptide antibody, IgG; Future  - Rheumatoid factor; Future  - Anti-scleroderma antibody; Future  - RNA polymerase III Ab, IgG; Future  - PM-Scl Antibody by Immunodiffusion; Future  - Anti Sm/RNP Antibody; Future  - Th/To Antibody; Future  - MyoMarker Panel 3; Future  - CT Chest Without Contrast; Future  - 2D echo with color flow doppler; Future  - Protein electrophoresis, serum; Standing  - Immunofixation electrophoresis; Standing  - Immunoglobulins (IgG, IgA, IgM) Quantitative; Standing  - CK; Standing  - Aldolase; Future    Hypothyroidism due to Hashimoto's thyroiditis  Hypothyroidism associated with Hashimoto's thyroiditis with severe chronic fatigue and inability to lose weight.  Start low-dose levothyroxine 25 mcg once daily.  Advised all precautions before taking thyroxine tablets  - levothyroxine (SYNTHROID) 25 MCG tablet; Take 1 tablet (25 mcg total) by mouth once daily.  Dispense: 30 tablet; Refill: 11    Acute cystitis with hematuria  Have completed antibiotics given by her primary care physician.  She still has dysuria.  Repeat UA with urine culture.  - Urinalysis; Future  - CULTURE, URINE; Future    Recurrent infections  Recurrent infections associated with mucocutaneous ulcers, cystitis and nasopharyngeal it is.  Check immunoglobulin levels to rule out any underlying immunodeficiency disorder.  - Complement, Alternate Pathway (AH50); Future  - IgE; Future  - Lymphocyte Profile II; Future  - CH50; Future     # RTC in 4 weeks.   Time spent: 60 minutes in face to face discussion concerning diagnosis, prognosis, review of lab and test results, benefits of treatment as well as management of disease, counseling of patient and coordination of care between various health care providers . Greater than half of the time spent - 35 minutes was used for coordination of care and counseling of  patient.    Disclaimer: This note was prepared using voice recognition system and is likely to have sound alike errors and is not proof read.  Please call me with any questions.

## 2018-09-27 NOTE — TELEPHONE ENCOUNTER
Urine culture growing Klebsiella that is resistant to several bacteria.  It shows intermediate resistance to augmentin and macrobid.  The only other drugs it is resistant to are IV antibiotics.  Advise her that I will treat this with augmentin x 7 days.  Rx sent to pharmacy.  Needs a repeat urine culture in 7-10 days.  Order is in.

## 2018-09-28 ENCOUNTER — HOSPITAL ENCOUNTER (OUTPATIENT)
Dept: RADIOLOGY | Facility: HOSPITAL | Age: 48
Discharge: HOME | End: 2018-09-28
Attending: INTERNAL MEDICINE
Payer: COMMERCIAL

## 2018-09-28 DIAGNOSIS — M35.9 UNDIFFERENTIATED CONNECTIVE TISSUE DISEASE: ICD-10-CM

## 2018-09-28 LAB
ALBUMIN SERPL ELPH-MCNC: 4.27 G/DL
ALDOLASE SERPL-CCNC: 4 U/L
ALPHA1 GLOB SERPL ELPH-MCNC: 0.29 G/DL
ALPHA2 GLOB SERPL ELPH-MCNC: 0.72 G/DL
ANA SER QL IF: NORMAL
ANTI SM/RNP ANTIBODY: 2.43 EU
ANTI-SM/RNP INTERPRETATION: NEGATIVE
B-GLOBULIN SERPL ELPH-MCNC: 0.97 G/DL
CD3+CD4+ CELLS # BLD: 1304 CELLS/UL (ref 300–1400)
CD3+CD4+ CELLS NFR BLD: 54.9 % (ref 28–57)
GAMMA GLOB SERPL ELPH-MCNC: 1.15 G/DL
INTERPRETATION SERPL IFE-IMP: NORMAL
LYMPHOCYTES NFR CSF MANUAL: 11.3 % (ref 7–31)
LYMPHOCYTES NFR CSF MANUAL: 11.5 % (ref 6–19)
LYMPHOCYTES NFR CSF MANUAL: 1868 CELLS/UL (ref 700–2100)
LYMPHOCYTES NFR CSF MANUAL: 2.31 % (ref 0.9–3.6)
LYMPHOCYTES NFR CSF MANUAL: 23.8 % (ref 10–39)
LYMPHOCYTES NFR CSF MANUAL: 264 CELLS/UL (ref 90–600)
LYMPHOCYTES NFR CSF MANUAL: 269 CELLS/UL (ref 100–500)
LYMPHOCYTES NFR CSF MANUAL: 566 CELLS/UL (ref 200–900)
LYMPHOCYTES NFR CSF MANUAL: 78.7 % (ref 55–83)
PROT SERPL-MCNC: 7.4 G/DL

## 2018-09-28 PROCEDURE — 71250 CT THORAX DX C-: CPT | Mod: 26,,, | Performed by: RADIOLOGY

## 2018-09-28 PROCEDURE — 71250 CT THORAX DX C-: CPT | Mod: TC,PO

## 2018-09-29 LAB — ENA SCL70 IGG SER IA-ACNC: <0.2 U

## 2018-09-30 ENCOUNTER — PATIENT MESSAGE (OUTPATIENT)
Dept: RHEUMATOLOGY | Facility: CLINIC | Age: 48
End: 2018-09-30

## 2018-10-01 ENCOUNTER — DOCUMENTATION ONLY (OUTPATIENT)
Dept: ENDOSCOPY | Facility: HOSPITAL | Age: 48
End: 2018-10-01

## 2018-10-01 ENCOUNTER — PATIENT MESSAGE (OUTPATIENT)
Dept: RHEUMATOLOGY | Facility: CLINIC | Age: 48
End: 2018-10-01

## 2018-10-01 LAB
BACTERIA UR CULT: NORMAL
CH50 SERPL-ACNC: 80 U/ML
ENA SCL70 AB SER-ACNC: 5 UNITS

## 2018-10-01 NOTE — TELEPHONE ENCOUNTER
Ms. Garcia,   Urine still shows persistent infection from a bacteria called Klebsiella. Testing shows that it is still responsive to the antibiotics given by . Since you are still having infection I would advice to contact her so that she could start you on a different antibiotic. Please let me know if you have any questions  Or concerns.   Regards

## 2018-10-02 ENCOUNTER — CLINICAL SUPPORT (OUTPATIENT)
Dept: CARDIOLOGY | Facility: CLINIC | Age: 48
End: 2018-10-02
Attending: INTERNAL MEDICINE
Payer: COMMERCIAL

## 2018-10-02 DIAGNOSIS — M35.9 UNDIFFERENTIATED CONNECTIVE TISSUE DISEASE: ICD-10-CM

## 2018-10-02 LAB
COMPLEMENT,ALTERNATE PATHWAY (AH50): >110 %OF NORM
DIASTOLIC DYSFUNCTION: NO
ESTIMATED PA SYSTOLIC PRESSURE: 26
PATHOLOGIST INTERPRETATION IFE: NORMAL
PATHOLOGIST INTERPRETATION SPE: NORMAL
RETIRED EF AND QEF - SEE NOTES: 60 (ref 55–65)

## 2018-10-02 PROCEDURE — 93306 TTE W/DOPPLER COMPLETE: CPT | Mod: S$GLB,,, | Performed by: INTERNAL MEDICINE

## 2018-10-03 LAB — RNA POLYMERASE III ANTIBODIES, IGG, SERUM: <10 U

## 2018-10-11 ENCOUNTER — DOCUMENTATION ONLY (OUTPATIENT)
Dept: ENDOSCOPY | Facility: HOSPITAL | Age: 48
End: 2018-10-11

## 2018-10-11 LAB

## 2018-10-17 ENCOUNTER — PATIENT MESSAGE (OUTPATIENT)
Dept: RHEUMATOLOGY | Facility: CLINIC | Age: 48
End: 2018-10-17

## 2018-11-12 ENCOUNTER — PATIENT MESSAGE (OUTPATIENT)
Dept: RHEUMATOLOGY | Facility: CLINIC | Age: 48
End: 2018-11-12

## 2018-11-15 ENCOUNTER — OFFICE VISIT (OUTPATIENT)
Dept: OBSTETRICS AND GYNECOLOGY | Facility: CLINIC | Age: 48
End: 2018-11-15
Payer: COMMERCIAL

## 2018-11-15 VITALS
BODY MASS INDEX: 48.72 KG/M2 | WEIGHT: 264.75 LBS | HEIGHT: 62 IN | DIASTOLIC BLOOD PRESSURE: 88 MMHG | SYSTOLIC BLOOD PRESSURE: 142 MMHG

## 2018-11-15 DIAGNOSIS — B37.31 VULVOVAGINAL CANDIDIASIS: Primary | ICD-10-CM

## 2018-11-15 DIAGNOSIS — R10.31 RIGHT LOWER QUADRANT PAIN: ICD-10-CM

## 2018-11-15 PROCEDURE — 99999 PR PBB SHADOW E&M-EST. PATIENT-LVL III: CPT | Mod: PBBFAC,,, | Performed by: OBSTETRICS & GYNECOLOGY

## 2018-11-15 PROCEDURE — 3008F BODY MASS INDEX DOCD: CPT | Mod: CPTII,S$GLB,, | Performed by: OBSTETRICS & GYNECOLOGY

## 2018-11-15 PROCEDURE — 3079F DIAST BP 80-89 MM HG: CPT | Mod: CPTII,S$GLB,, | Performed by: OBSTETRICS & GYNECOLOGY

## 2018-11-15 PROCEDURE — 99213 OFFICE O/P EST LOW 20 MIN: CPT | Mod: 25,S$GLB,, | Performed by: OBSTETRICS & GYNECOLOGY

## 2018-11-15 PROCEDURE — 87210 SMEAR WET MOUNT SALINE/INK: CPT | Mod: QW,S$GLB,, | Performed by: OBSTETRICS & GYNECOLOGY

## 2018-11-15 PROCEDURE — 3077F SYST BP >= 140 MM HG: CPT | Mod: CPTII,S$GLB,, | Performed by: OBSTETRICS & GYNECOLOGY

## 2018-11-15 RX ORDER — DICLOFENAC SODIUM 10 MG/G
GEL TOPICAL
Refills: 11 | COMMUNITY
Start: 2018-10-16 | End: 2020-06-01

## 2018-11-15 RX ORDER — FLUCONAZOLE 150 MG/1
TABLET ORAL
COMMUNITY
Start: 2018-11-15 | End: 2020-06-01

## 2018-11-15 RX ORDER — FLUCONAZOLE 150 MG/1
150 TABLET ORAL EVERY OTHER DAY
Qty: 3 TABLET | Refills: 0 | Status: SHIPPED | OUTPATIENT
Start: 2018-11-15 | End: 2018-11-20

## 2018-11-15 RX ORDER — CIPROFLOXACIN 500 MG/1
TABLET ORAL
Refills: 0 | COMMUNITY
Start: 2018-11-13 | End: 2020-06-01

## 2018-11-15 RX ORDER — NITROFURANTOIN 25; 75 MG/1; MG/1
100 CAPSULE ORAL DAILY
Refills: 0 | COMMUNITY
Start: 2018-11-08 | End: 2020-06-01

## 2018-11-15 RX ORDER — DULOXETIN HYDROCHLORIDE 60 MG/1
120 CAPSULE, DELAYED RELEASE ORAL DAILY
Refills: 6 | COMMUNITY
Start: 2018-11-05 | End: 2020-06-01

## 2018-11-15 NOTE — PROGRESS NOTES
Subjective:       Patient ID: Radha Davila is a 48 y.o. female.    Chief Complaint:  Vaginal Itching      History of Present Illness  HPI  Presents with vulvar itching.  Patient has known hx of lichen sclerosus, and has tried using clobetasol for itching with minimal improvement.  She is currently on cipro for a UTI diagnosed by her urologist.  Since then, she started having worsening vulvar itching.  Took diflucan x 1 dose on Monday, and that somewhat helped, but still itches.  Notices a bump on the medial aspect of the right labium near the clitoris.  It was painful, burned, and itches, but now seems smaller in size.  Patient also c/o RLQ cramping that comes and goes.  No relation to voiding or BM's.      GYN & OB History  No LMP recorded. Patient has had an ablation.   Date of Last Pap: 2017    OB History    Para Term  AB Living   4 2 2   2 2   SAB TAB Ectopic Multiple Live Births   2              # Outcome Date GA Lbr Joe/2nd Weight Sex Delivery Anes PTL Lv   4 SAB            3 SAB            2 Term      CS-Unspec      1 Term      CS-Unspec             Review of Systems  Review of Systems   Constitutional: Negative for fatigue, fever and unexpected weight change.   Gastrointestinal: Negative for abdominal pain, constipation, diarrhea, nausea and vomiting.   Genitourinary: Positive for genital sores, pelvic pain (RLQ) and vaginal discharge. Negative for dysuria, frequency, urgency, vaginal bleeding, vaginal pain, postcoital bleeding and vaginal odor.           Objective:    Physical Exam:   Constitutional: She is oriented to person, place, and time. She appears well-developed and well-nourished. No distress.             Abdominal: Soft. She exhibits no distension and no mass. There is no tenderness. There is no rebound and no guarding. Hernia confirmed negative in the right inguinal area and confirmed negative in the left inguinal area.   obese     Genitourinary:       Pelvic exam was  performed with patient supine. There is no rash, tenderness, lesion or injury on the right labia. There is no rash, tenderness, lesion or injury on the left labia. Uterus is not deviated, not enlarged, not fixed, not tender and not experiencing uterine prolapse. Right adnexum displays tenderness. Right adnexum displays no mass and no fullness. Left adnexum displays no mass, no tenderness and no fullness. There is erythema in the vagina. No tenderness, bleeding, rectocele, cystocele or unspecified prolapse of vaginal walls in the vagina. No foreign body in the vagina. No signs of injury around the vagina. Vaginal discharge (white) found. Cervix exhibits no motion tenderness, no discharge and no friability.        Uterus Size: 6 cm       Neurological: She is alert and oriented to person, place, and time.     Psychiatric: She has a normal mood and affect.        wet prep: yeast  Assessment:        1. Vulvovaginal candidiasis    2. Right lower quadrant pain                Plan:      Radha was seen today for vaginal itching.    Diagnoses and all orders for this visit:    Vulvovaginal candidiasis  -     fluconazole (DIFLUCAN) 150 MG Tab; Take 1 tablet (150 mg total) by mouth every other day. for 3 doses    Right lower quadrant pain  -     US Pelvis Complete Non OB; Future    Can use topical monistat OTC until symptoms resolve.  Decrease frequency of clobetasol.  Eliminate all soaps and detergents that have perfumes or scents in them.  Keep follow-up with urologist for recent UTI.  RTC prn

## 2018-11-20 ENCOUNTER — HOSPITAL ENCOUNTER (OUTPATIENT)
Dept: RADIOLOGY | Facility: HOSPITAL | Age: 48
Discharge: HOME | End: 2018-11-20
Attending: OBSTETRICS & GYNECOLOGY
Payer: COMMERCIAL

## 2018-11-20 DIAGNOSIS — R10.31 RIGHT LOWER QUADRANT PAIN: ICD-10-CM

## 2018-11-20 PROCEDURE — 76856 US EXAM PELVIC COMPLETE: CPT | Mod: TC,PO

## 2018-11-20 PROCEDURE — 76856 US EXAM PELVIC COMPLETE: CPT | Mod: 26,,, | Performed by: RADIOLOGY

## 2018-11-20 PROCEDURE — 76830 TRANSVAGINAL US NON-OB: CPT | Mod: 26,,, | Performed by: RADIOLOGY

## 2018-11-20 PROCEDURE — 76830 TRANSVAGINAL US NON-OB: CPT | Mod: TC,PO

## 2019-01-06 ENCOUNTER — PATIENT MESSAGE (OUTPATIENT)
Dept: RHEUMATOLOGY | Facility: CLINIC | Age: 49
End: 2019-01-06

## 2019-02-01 ENCOUNTER — PATIENT MESSAGE (OUTPATIENT)
Dept: RHEUMATOLOGY | Facility: CLINIC | Age: 49
End: 2019-02-01

## 2019-02-04 ENCOUNTER — OFFICE VISIT (OUTPATIENT)
Dept: RHEUMATOLOGY | Facility: CLINIC | Age: 49
End: 2019-02-04
Payer: COMMERCIAL

## 2019-02-04 VITALS
WEIGHT: 258.63 LBS | SYSTOLIC BLOOD PRESSURE: 151 MMHG | DIASTOLIC BLOOD PRESSURE: 88 MMHG | HEIGHT: 62 IN | BODY MASS INDEX: 47.59 KG/M2 | HEART RATE: 95 BPM

## 2019-02-04 DIAGNOSIS — E66.01 OBESITY, MORBID, BMI 40.0-49.9: ICD-10-CM

## 2019-02-04 DIAGNOSIS — B99.9 RECURRENT INFECTIONS: ICD-10-CM

## 2019-02-04 DIAGNOSIS — E06.3 HYPOTHYROIDISM DUE TO HASHIMOTO'S THYROIDITIS: ICD-10-CM

## 2019-02-04 DIAGNOSIS — E07.9 THYROID DISEASE: ICD-10-CM

## 2019-02-04 DIAGNOSIS — E03.8 HYPOTHYROIDISM DUE TO HASHIMOTO'S THYROIDITIS: ICD-10-CM

## 2019-02-04 DIAGNOSIS — M17.0 PRIMARY OSTEOARTHRITIS OF BOTH KNEES: Primary | ICD-10-CM

## 2019-02-04 PROCEDURE — 3077F PR MOST RECENT SYSTOLIC BLOOD PRESSURE >= 140 MM HG: ICD-10-PCS | Mod: CPTII,S$GLB,, | Performed by: INTERNAL MEDICINE

## 2019-02-04 PROCEDURE — 99215 PR OFFICE/OUTPT VISIT, EST, LEVL V, 40-54 MIN: ICD-10-PCS | Mod: S$GLB,,, | Performed by: INTERNAL MEDICINE

## 2019-02-04 PROCEDURE — 3079F PR MOST RECENT DIASTOLIC BLOOD PRESSURE 80-89 MM HG: ICD-10-PCS | Mod: CPTII,S$GLB,, | Performed by: INTERNAL MEDICINE

## 2019-02-04 PROCEDURE — 3079F DIAST BP 80-89 MM HG: CPT | Mod: CPTII,S$GLB,, | Performed by: INTERNAL MEDICINE

## 2019-02-04 PROCEDURE — 3008F BODY MASS INDEX DOCD: CPT | Mod: CPTII,S$GLB,, | Performed by: INTERNAL MEDICINE

## 2019-02-04 PROCEDURE — 3008F PR BODY MASS INDEX (BMI) DOCUMENTED: ICD-10-PCS | Mod: CPTII,S$GLB,, | Performed by: INTERNAL MEDICINE

## 2019-02-04 PROCEDURE — 99999 PR PBB SHADOW E&M-EST. PATIENT-LVL IV: ICD-10-PCS | Mod: PBBFAC,,, | Performed by: INTERNAL MEDICINE

## 2019-02-04 PROCEDURE — 3077F SYST BP >= 140 MM HG: CPT | Mod: CPTII,S$GLB,, | Performed by: INTERNAL MEDICINE

## 2019-02-04 PROCEDURE — 99999 PR PBB SHADOW E&M-EST. PATIENT-LVL IV: CPT | Mod: PBBFAC,,, | Performed by: INTERNAL MEDICINE

## 2019-02-04 PROCEDURE — 99215 OFFICE O/P EST HI 40 MIN: CPT | Mod: S$GLB,,, | Performed by: INTERNAL MEDICINE

## 2019-02-04 RX ORDER — METFORMIN HYDROCHLORIDE 500 MG/1
TABLET, EXTENDED RELEASE ORAL
Refills: 11 | COMMUNITY
Start: 2019-01-28

## 2019-02-04 NOTE — PROGRESS NOTES
RHEUMATOLOGY CLINIC FOLLOW UP VISIT  Chief complaints:-  Follow up joint pains and concern for recurrent infections    HPI:-  Radha Christiansen a 48 y.o. pleasant female comes in for an initial visit with above chief complaints.     At initial visit with [facility] rheumatology in September of 2018, She reported having recurrent infections in the past 5 years associated with tennis over angiitis, bladder infections, mucocutaneous ulcers associated with severe fatigue, joint pains, muscle pain and weakness.  She was diagnosed with some autoimmune disorder several years ago which she is not sure.  She followed up with the rheumatologist in [place] at that time and was told not to worry about autoimmune disease since it was very mild.  She was also diagnosed with multiple other problems as reported in the past medical history without any clear underlying cause in the last several years.  She has history of photosensitive malar rash, sicca syndrome, recurrent mucocutaneous ulcers.  She denies any history of treatment resistant headaches, seizures or psychosis.  She complains of both mechanical and inflammatory type of pain over her several small and large joints.     At her initial visit pt did not have any active synovitis and patients blood work from that day were unrevealing showing a negative BENITO and negative RNP antibody which were previously positive.  Pt complains today of bilateral knee pain and right shoulder pain after use.  She stated the pain is worse at the end of the day or after activities.     Pt denied recent rashes, ulcerations of mouth or nose, no swelling of her joints.      Review of Systems   Constitutional: Positive for malaise/fatigue. Negative for chills and fever.   HENT: Negative for congestion and sore throat.    Eyes: Negative for blurred vision and redness.   Respiratory: Negative for cough and shortness of breath.   "  Cardiovascular: Negative for chest pain and leg swelling.   Gastrointestinal: Negative for abdominal pain.   Genitourinary: Negative for dysuria and hematuria.   Musculoskeletal: Positive for joint pain and neck pain. Negative for back pain, falls and myalgias.   Skin: Negative for rash.   Neurological: Positive for tingling and sensory change. Negative for headaches.   Endo/Heme/Allergies: Does not bruise/bleed easily.   Psychiatric/Behavioral: Positive for depression. Negative for memory loss. The patient is nervous/anxious and has insomnia.        Past Medical History:   Diagnosis Date    Anemia     Anxiety     Fibrocystic breast     Hypertension     Infertility, female     Insulin resistance     Lichen sclerosus     Molar pregnancy     Multinodular goiter 2012    Thyroid disease     Ulcer        Past Surgical History:   Procedure Laterality Date     SECTION      x2    CHOLECYSTECTOMY      COLONOSCOPY W/ POLYPECTOMY  4/3/2012    DILATION AND CURETTAGE OF UTERUS      x2    ENDOMETRIAL ABLATION      [place] UPPER GI ENDOSCOPY,BIOPSY  4/3/2012         TUBAL LIGATION          Social History     Tobacco Use    Smoking status: Never Smoker    Smokeless tobacco: Never Used   Substance Use Topics    Alcohol use: No    Drug use: No       Family History   Problem Relation Age of Onset    Cancer Maternal Grandfather         cancer    Colon cancer Maternal Grandfather     Cancer Paternal Grandfather     Colon polyps Mother     Breast cancer Neg Hx     Ovarian cancer Neg Hx        Review of patient's allergies indicates:  No Known Allergies        Physical examination:-    Vitals:    19 1242   BP: (!) 151/88   Pulse: 95   Weight: 117.3 kg (258 lb 9.6 oz)   Height: 5' 2" (1.575 m)   PainSc: 0-No pain       Physical Exam   Constitutional: She is oriented to person, place, and time and well-developed, well-nourished, and in no distress. No distress.   HENT:   Head: Normocephalic. "   Mouth/Throat: Oropharynx is clear and moist.   Eyes: Conjunctivae and EOM are normal. Pupils are equal, round, and reactive to light.   Neck: Normal range of motion. Neck supple.   Cardiovascular: Normal rate and intact distal pulses.   Pulmonary/Chest: Effort normal. No respiratory distress.   Abdominal: Soft. There is no tenderness.   Musculoskeletal:     No significant synovitis detected anywhere. No enthesitis, effusions.    B/l crepitus of knees   Neurological: She is alert and oriented to person, place, and time. No cranial nerve deficit.   Skin: Skin is warm. No rash noted. No erythema.   Psychiatric: Mood and affect normal.   Nursing note and vitals reviewed.      Labs:-     Ref. Range 4/22/2010 10:26 6/9/2010 10:47 7/26/2010 16:40   BENITO Latest Ref Range: Neg <1:160  Pos, Titer to follow (A) Pos, Titer to follow (A)    BENITO HEP-2 Titer Latest Ref Range: Neg <1:160 titer Pos 1:320 (A) Pos 1:320 (A)    BENITO Hep-2 Pattern Unknown Speckled (A) Speckled (A)    Anti-SSA Antibody Latest Ref Range: <20 EU 7.34 7.73    Anti-SSA Interpretation Latest Ref Range: Negative  Negative Negative    Anti-SSB Antibody Latest Ref Range: <20 EU 2.82 2.69    Anti-SSB Interpretation Latest Ref Range: Negative  Negative Negative    ds DNA Ab Latest Ref Range: Negative Titer Negative Negative    Anti Sm Antibody Latest Ref Range: <20 EU 1.76 4.70    Anti-Sm Interpretation Latest Ref Range: Negative  Negative Negative    Anti Sm/RNP Antibody Latest Ref Range: <20 EU 28.60 26.80    Anti-Sm/RNP Interpretation Latest Ref Range: Negative  Positive (A) Positive (A)    Complement (C-3) Latest Ref Range: 50 - 180 mg/dl   122   Complement (C-4) Latest Ref Range: 11 - 44 mg/dl   39   Rheumatoid Factor Latest Ref Range: 0 - 15 IU/ml 10           Medication List with Changes/Refills   Current Medications    ALPRAZOLAM (XANAX) 0.5 MG TABLET        BROMPHENIRAMIN-PHENYLEPHRIN-DM (ALA-HIST DM) 4-7.5-15 MG/5 ML LIQD    TAKE ONE TEASPOONFUL BY MOUTH  EVERY 4 HOURS    CIPROFLOXACIN HCL (CIPRO) 500 MG TABLET    Take 1 tablet (500 mg total) by mouth 2 (two) times daily for 14 doses    CLOBETASOL 0.05% (TEMOVATE) 0.05 % OINT    Apply topically daily as needed. For vulvar irritation    DICLOFENAC SODIUM (VOLTAREN) 1 % GEL    apply 4 grams to affected area 4 times daily as needed    DULOXETINE (CYMBALTA) 60 MG CAPSULE    Take 60 mg by mouth once daily.    FLUCONAZOLE (DIFLUCAN) 150 MG TAB    TAKE 1 TABLET BY MOUTH IN ONE DOSE    FLUTICASONE (FLONASE) 50 MCG/ACTUATION NASAL SPRAY    2 sprays by Each Nare route once daily.    HYDROCHLOROTHIAZIDE (HYDRODIURIL) 12.5 MG TAB    Take 12.5 mg by mouth.    LIRAGLUTIDE 0.6 MG/0.1 ML, 18 MG/3 ML, SUBQ PNIJ (VICTOZA 2-BRADEN) 0.6 MG/0.1 ML (18 MG/3 ML) PNIJ    Inject 0.6 mg into the skin.    METFORMIN (GLUCOPHAGE-XR) 500 MG 24 HR TABLET    Take 2 tablets (1,000 mg total) by mouth daily with supper    METHEN-MWillaBLUE-S.PHOS-PHSAL-HYO (URELLE) 81-10.8-40.8 MG TAB    Take 1 tablet by mouth.    METOPROLOL SUCCINATE (TOPROL-XL) 50 MG 24 HR TABLET    TAKE 1 TABLET BY MOUTH EVERY DAY    NITROFURANTOIN, MACROCRYSTAL-MONOHYDRATE, (MACROBID) 100 MG CAPSULE    Take 100 mg by mouth once daily.    OMEPRAZOLE (PRILOSEC) 40 MG CAPSULE    TAKE 1 CAPSULE BY MOUTH EVERY MORNING    SITAGLIPTAN-METFORMIN 50-1,000 MG TM24    Take 1 tablet by mouth.       Assessment/Plans:-    1. Primary osteoarthritis of both knees    2. Recurrent infections    3. Hypothyroidism due to Hashimoto's thyroiditis    4. Obesity, morbid, BMI 40.0-49.9      Problem List Items Addressed This Visit        ID    Recurrent infections    Current Assessment & Plan     No active underlying rheumatologic autoimmune condition, recent BENITO and RNP negative.  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         Relevant Orders    Ambulatory consult to Immunology       Endocrine    Thyroid disease    Current  Assessment & Plan     Recommended following up with PCP/endocrinology regarding further treatment.         Hypothyroidism due to Hashimoto's thyroiditis    Obesity, morbid, BMI 40.0-49.9    Current Assessment & Plan     Lengthy discussion ( for more than 25 min)  had with patient regarding diet, weight loss and exercise, recommended low carb, high protein diet.  Recommended low impact exercise and educated patient on how her weight is linked to her thyroid disease and how her weight is contributing to her knee pain.              Orthopedic    Primary osteoarthritis of both knees - Primary    Current Assessment & Plan     Patient with pain worse after activity with crepitus on exam, no overt swelling. Recommend physical therapy for b/l knee osteoarthritis for quad strengthening, counseled on weight loss, consider knee injections in future if pain does not improve.         Relevant Orders    Ambulatory Referral to Physical/Occupational Therapy    Ambulatory Referral to Physical/Occupational Therapy        RTC in 6 months

## 2019-02-04 NOTE — ASSESSMENT & PLAN NOTE
Patient with pain worse after activity with crepitus on exam, no overt swelling. Recommend physical therapy for b/l knee osteoarthritis for quad strengthening, counseled on weight loss, consider knee injections in future if pain does not improve.

## 2019-02-04 NOTE — ASSESSMENT & PLAN NOTE
Lengthy discussion had with patient regarding diet, weight loss and exercise, recommended low carb, high protein diet.  Recommended low impact exercise and educated patient on how her weight is linked to her thyroid disease and how her weight is contributing to her knee pain.

## 2019-02-04 NOTE — ASSESSMENT & PLAN NOTE
No active underlying rheumatologic autoimmune condition, recent BENITO and RNP negative.  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

## 2019-06-27 ENCOUNTER — PATIENT MESSAGE (OUTPATIENT)
Dept: FAMILY MEDICINE | Facility: CLINIC | Age: 49
End: 2019-06-27

## 2019-06-28 ENCOUNTER — PATIENT MESSAGE (OUTPATIENT)
Dept: FAMILY MEDICINE | Facility: CLINIC | Age: 49
End: 2019-06-28

## 2019-06-28 DIAGNOSIS — Z12.11 COLON CANCER SCREENING: Primary | ICD-10-CM

## 2019-07-03 NOTE — TELEPHONE ENCOUNTER
I have signed for the following orders AND/OR meds.  Please call the patient and ask the patient to schedule the testing AND/OR inform about any medications that were sent.      Orders Placed This Encounter   Procedures    Case request GI: COLONOSCOPY     Order Specific Question:   Pre-op Diagnosis     Answer:   Colon cancer screening [987001]     Order Specific Question:   CPT Code:     Answer:   [place] COLORECTAL CANCER SCREEN RESULTS DOCUMENT/REVIEW [3017F]     Order Specific Question:   Case Referring Provider     Answer:   ROSA DIAZ [[id number]]     Order Specific Question:   CPT Code:     Answer:   [place] COLON CA SCRN NOT HI RSK IND []     Order Specific Question:   Medical Necessity:     Answer:   Medically Non-Urgent [100]

## 2019-07-05 ENCOUNTER — TELEPHONE (OUTPATIENT)
Dept: ENDOSCOPY | Facility: HOSPITAL | Age: 49
End: 2019-07-05

## 2019-07-05 NOTE — TELEPHONE ENCOUNTER
Endoscopy Scheduling Questionnaire:    1. Have you been admitted overnight to the hospital in the past 3 months? no  2. Do you get chest pain and SOB while walking up a flight of stairs? no  3. Have you had a cardiac stent placed in the past 12 months? no  4. Have you had a stroke or heart attack in the past 6 months? no  5. Have you had any chest pain in the past 3 months? no      If so, have you been evaluated by your PCP or Cardiologist? no  6. Do you take medication for weight loss? no  7. Have you been diagnosed with Diverticulitis within the past 3 months? no  8. Are you having any GI symptoms that you feel need to be evaluated prior to your procedure? no  9. Are you on dialysis? no  10. Are you diabetic? Yes. Advised to hold diabetes medication the morning of procedure.    11. Do you have any other health issues that you feel might limit your ability to safely have the procedure and/or sedation? no  12. Is the patient over 79 yo? no        If so, has the patient been seen by their PCP or GI in the last 3 months? N/A       -I have reviewed the last colonoscopy for recommendations regarding surveillance and bowel prep  is NA  -I have reviewed the patient's medications and allergies. She is not on high risk medication, N/A A clearance request NA  -I have verified the pharmacy information. The prep being used is Miralax. The patient's prep instructions were sent via [facility] patient portal..    Date Endoscopy Scheduled: Date: 9/4/19

## 2019-07-25 ENCOUNTER — PATIENT MESSAGE (OUTPATIENT)
Dept: OBSTETRICS AND GYNECOLOGY | Facility: CLINIC | Age: 49
End: 2019-07-25

## 2019-07-25 DIAGNOSIS — B37.31 VULVOVAGINAL CANDIDIASIS: Primary | ICD-10-CM

## 2019-07-26 RX ORDER — FLUCONAZOLE 150 MG/1
150 TABLET ORAL ONCE
Qty: 1 TABLET | Refills: 0 | Status: SHIPPED | OUTPATIENT
Start: 2019-07-26 | End: 2019-07-26

## 2019-07-29 ENCOUNTER — PATIENT MESSAGE (OUTPATIENT)
Dept: OBSTETRICS AND GYNECOLOGY | Facility: CLINIC | Age: 49
End: 2019-07-29

## 2019-08-01 ENCOUNTER — PATIENT MESSAGE (OUTPATIENT)
Dept: OBSTETRICS AND GYNECOLOGY | Facility: CLINIC | Age: 49
End: 2019-08-01

## 2019-08-28 ENCOUNTER — TELEPHONE (OUTPATIENT)
Dept: FAMILY MEDICINE | Facility: CLINIC | Age: 49
End: 2019-08-28

## 2019-08-28 ENCOUNTER — PATIENT MESSAGE (OUTPATIENT)
Dept: FAMILY MEDICINE | Facility: CLINIC | Age: 49
End: 2019-08-28

## 2019-08-28 NOTE — TELEPHONE ENCOUNTER
We are using a miralax prep which is OTC now.    I will be asking you to use a MIRALAX/MAGNESIUM CITRATE prep for your colonoscopy. To do this, you will need to get some items that are over the counter at your pharmacy.      3 DAYS PRIOR TO THE COLONOSCOPY:get a bottle of MAGNESIUM CITRATE and one bottle of MIRALAX along with 2 liters of GATORADE.     Use a clear liquid diet for TWO DAYS before the procedure.  Eat no solid foods for those two days.     You may have:  -coffee, water, tea ( no mild or cremer), carbonated soft drinks, gelatin desters wtihout red/purple or orange colof,   -apple juice, white grape juice or cranberry juice and no pulp.   -Gatorade/powerade/lemonade/limeade  -Clear fat free bee of chicken broths or bouillon  -Snowballs popsicles, slushes without red coloring  -Clear hard candies, sugar and salt.    3:00 PM TWO DAYS PRIOR TO THE EXAM, drink 1 bottle of magnesium citrate then continue the clear liquid diet    3:00 PM ONE DAY PRIOR TO THE EXAM, drink 1 bottle of miralax mixed with the 2 liters of gatorade.  This is to be consumed within one hour.  Then continue the clear liquid diet for the mremainder of the day.  Do not drink anything after midnight.      Present for the colonoscopy the following morning.

## 2019-08-28 NOTE — TELEPHONE ENCOUNTER
----- Message from Radha Davila sent at 8/28/2019  8:49 AM CDT -----  Regarding: RE: Reminder for Upcoming Procedure  Contact: [phone]  will you send the prescription to my pharmacy?    ----- Message -----  From: Tiago Nieto MD  Sent: 8/28/19, 8:30 AM  To: Radha Davila  Subject: Reminder for Upcoming Procedure    [facility] [address] [place] [id number]    08/28/2019      Dear Radha,      This is a reminder for your upcoming procedure with Tiago Nieto MD on 9/4/2019. We will contact you again before the day of your procedure with your scheduled arrival time unless you have already received this information from your physicians office.         If you have questions or scheduling concerns, you can contact your physicians office:   Tiago Nieto MD  Phone Number: [phone]      Sincerely,     [facility] [address] [place] [id number]

## 2019-10-07 ENCOUNTER — TELEPHONE (OUTPATIENT)
Dept: ENDOSCOPY | Facility: HOSPITAL | Age: 49
End: 2019-10-07

## 2019-10-07 NOTE — TELEPHONE ENCOUNTER
Attempted to call pt in regards to her colonoscopy scheduled for 11/26/19. Due to an endo schedule change, pt will need to be rescheduled. Vm left and pt portal message sent.

## 2019-10-25 ENCOUNTER — TELEPHONE (OUTPATIENT)
Dept: ENDOSCOPY | Facility: HOSPITAL | Age: 49
End: 2019-10-25

## 2019-10-25 NOTE — TELEPHONE ENCOUNTER
Called pt in regards to her colonoscopy scheduled for 11/26/19. Pt notified of the physician change from Dr. Nieto to Dr. Heredia due to an endo kelly change. She verbalized an understanding.

## 2019-11-22 ENCOUNTER — TELEPHONE (OUTPATIENT)
Dept: GASTROENTEROLOGY | Facility: CLINIC | Age: 49
End: 2019-11-22

## 2019-11-22 NOTE — TELEPHONE ENCOUNTER
Patient was told she might have to pay 600.00 and she was going to cancel her Colonoscopy , I gave her the number for the [facility] department . She is to call me back if she still wants to cancel.

## 2019-11-22 NOTE — TELEPHONE ENCOUNTER
----- Message from Margaret Mendenhall sent at 11/22/2019 12:20 PM CST -----  Contact: [phone]/self   Patient is requesting to speak with you to cancel her procedure on 11-26-19. Please call and advise.

## 2019-11-25 PROBLEM — Z12.11 ENCOUNTER FOR SCREENING COLONOSCOPY: Status: ACTIVE | Noted: 2019-11-25

## 2020-05-05 ENCOUNTER — PATIENT MESSAGE (OUTPATIENT)
Dept: FAMILY MEDICINE | Facility: CLINIC | Age: 50
End: 2020-05-05

## 2020-05-05 NOTE — TELEPHONE ENCOUNTER
BP Readings from Last 3 Encounters:   02/04/19 (!) 151/88   11/15/18 (!) 142/88   09/27/18 (!) 152/100     The patient's bp's have been high.  Book her to get a visit with me, have her get a cuff for the visit and do it as a virtual.  If she will start collecting bp's daily until our visit, that would be great.

## 2020-06-01 ENCOUNTER — OFFICE VISIT (OUTPATIENT)
Dept: FAMILY MEDICINE | Facility: CLINIC | Age: 50
End: 2020-06-01
Payer: COMMERCIAL

## 2020-06-01 VITALS
HEIGHT: 62 IN | WEIGHT: 258 LBS | HEART RATE: 92 BPM | TEMPERATURE: 98 F | BODY MASS INDEX: 47.48 KG/M2 | DIASTOLIC BLOOD PRESSURE: 82 MMHG | SYSTOLIC BLOOD PRESSURE: 139 MMHG

## 2020-06-01 DIAGNOSIS — F41.9 ANXIETY: Primary | ICD-10-CM

## 2020-06-01 DIAGNOSIS — I10 HYPERTENSION, UNSPECIFIED TYPE: ICD-10-CM

## 2020-06-01 DIAGNOSIS — Z23 IMMUNIZATION DUE: ICD-10-CM

## 2020-06-01 DIAGNOSIS — G89.29 CHRONIC BILATERAL LOW BACK PAIN WITHOUT SCIATICA: ICD-10-CM

## 2020-06-01 DIAGNOSIS — M54.50 CHRONIC BILATERAL LOW BACK PAIN WITHOUT SCIATICA: ICD-10-CM

## 2020-06-01 DIAGNOSIS — E66.01 OBESITY, MORBID, BMI 40.0-49.9: ICD-10-CM

## 2020-06-01 DIAGNOSIS — Z83.719 FAMILY HISTORY OF COLONIC POLYPS: ICD-10-CM

## 2020-06-01 DIAGNOSIS — E03.8 HYPOTHYROIDISM DUE TO HASHIMOTO'S THYROIDITIS: ICD-10-CM

## 2020-06-01 DIAGNOSIS — E06.3 HYPOTHYROIDISM DUE TO HASHIMOTO'S THYROIDITIS: ICD-10-CM

## 2020-06-01 DIAGNOSIS — Z12.31 ENCOUNTER FOR SCREENING MAMMOGRAM FOR BREAST CANCER: ICD-10-CM

## 2020-06-01 PROCEDURE — 90471 TDAP VACCINE GREATER THAN OR EQUAL TO 7YO IM: ICD-10-PCS | Mod: S$GLB,,, | Performed by: FAMILY MEDICINE

## 2020-06-01 PROCEDURE — 99203 PR OFFICE/OUTPT VISIT, NEW, LEVL III, 30-44 MIN: ICD-10-PCS | Mod: 25,S$GLB,, | Performed by: FAMILY MEDICINE

## 2020-06-01 PROCEDURE — 3079F DIAST BP 80-89 MM HG: CPT | Mod: CPTII,S$GLB,, | Performed by: FAMILY MEDICINE

## 2020-06-01 PROCEDURE — 99203 OFFICE O/P NEW LOW 30 MIN: CPT | Mod: 25,S$GLB,, | Performed by: FAMILY MEDICINE

## 2020-06-01 PROCEDURE — 90715 TDAP VACCINE GREATER THAN OR EQUAL TO 7YO IM: ICD-10-PCS | Mod: S$GLB,,, | Performed by: FAMILY MEDICINE

## 2020-06-01 PROCEDURE — 3079F PR MOST RECENT DIASTOLIC BLOOD PRESSURE 80-89 MM HG: ICD-10-PCS | Mod: CPTII,S$GLB,, | Performed by: FAMILY MEDICINE

## 2020-06-01 PROCEDURE — 3008F PR BODY MASS INDEX (BMI) DOCUMENTED: ICD-10-PCS | Mod: CPTII,S$GLB,, | Performed by: FAMILY MEDICINE

## 2020-06-01 PROCEDURE — 90471 IMMUNIZATION ADMIN: CPT | Mod: S$GLB,,, | Performed by: FAMILY MEDICINE

## 2020-06-01 PROCEDURE — 99999 PR PBB SHADOW E&M-EST. PATIENT-LVL IV: ICD-10-PCS | Mod: PBBFAC,,, | Performed by: FAMILY MEDICINE

## 2020-06-01 PROCEDURE — 3075F SYST BP GE 130 - 139MM HG: CPT | Mod: CPTII,S$GLB,, | Performed by: FAMILY MEDICINE

## 2020-06-01 PROCEDURE — 3008F BODY MASS INDEX DOCD: CPT | Mod: CPTII,S$GLB,, | Performed by: FAMILY MEDICINE

## 2020-06-01 PROCEDURE — 99999 PR PBB SHADOW E&M-EST. PATIENT-LVL IV: CPT | Mod: PBBFAC,,, | Performed by: FAMILY MEDICINE

## 2020-06-01 PROCEDURE — 90715 TDAP VACCINE 7 YRS/> IM: CPT | Mod: S$GLB,,, | Performed by: FAMILY MEDICINE

## 2020-06-01 PROCEDURE — 3075F PR MOST RECENT SYSTOLIC BLOOD PRESS GE 130-139MM HG: ICD-10-PCS | Mod: CPTII,S$GLB,, | Performed by: FAMILY MEDICINE

## 2020-06-01 RX ORDER — MELOXICAM 15 MG/1
15 TABLET ORAL DAILY
Qty: 30 TABLET | Refills: 0 | Status: SHIPPED | OUTPATIENT
Start: 2020-06-01 | End: 2020-07-13 | Stop reason: SDUPTHER

## 2020-06-01 RX ORDER — DULOXETIN HYDROCHLORIDE 30 MG/1
CAPSULE, DELAYED RELEASE ORAL
Qty: 45 CAPSULE | Refills: 0 | Status: SHIPPED | OUTPATIENT
Start: 2020-06-01 | End: 2020-06-29

## 2020-06-01 RX ORDER — SODIUM, POTASSIUM,MAG SULFATES 17.5-3.13G
SOLUTION, RECONSTITUTED, ORAL ORAL
Qty: 354 ML | Refills: 0 | Status: SHIPPED | OUTPATIENT
Start: 2020-06-01 | End: 2020-07-26

## 2020-06-01 RX ORDER — ESCITALOPRAM OXALATE 10 MG/1
10 TABLET ORAL DAILY
Qty: 30 TABLET | Refills: 0 | Status: SHIPPED | OUTPATIENT
Start: 2020-06-01 | End: 2021-06-01

## 2020-06-01 NOTE — PROGRESS NOTES
Subjective:      Patient ID: Radha Davila is a 49 y.o. female.    Chief Complaint: Other Misc (doesnt feel like cymbalta is working )    Problem List Items Addressed This Visit     Anxiety    Overview     She has anxiety and the cymbalta does not help her. She has had loss of desire to to things that she should be doing and she has nervousness over all of the things going on in the country right now. She is trying to adjust to teaching online.          Chronic bilateral low back pain without sciatica    Overview     Low Back Pain: Patient complains of chronic low back pain. This is evaluated as a personal injury. The patient first noted symptoms 3months ago. It was not related to no known injury. The pain is rated moderate, and is located at the across the lower back. The pain is described as aching and occurs all day. The symptoms has been progressive. Symptoms are exacerbated by walking. Factors which relieve the pain include muscle relaxants and NSAIDs. Other associated symptoms include no other symptoms. Previous history of symptoms: never.             Family history of colonic polyps    Overview     She states that her mother had colon polyps. She had a cscope in 2012 and is overdue now.         Hypertension    Overview     The patient presents with essential hypertension.  The patient is tolerating the medication well and is in excellent compliance.  The patient is experiencing no side effects.  Counseling was offered regarding low salt diets.  The patient has a reduced salt intake.  The patient denies chest pain, palpitations, shortness of breath, dyspnea on exertion, left or murmur neck pain, nausea, vomiting, diaphoresis, paroxysmal nocturnal dyspnea, and orthopnea.   Hypertension Medications             hydrochlorothiazide (HYDRODIURIL) 12.5 MG Tab Take 12.5 mg by mouth.    metoprolol succinate (TOPROL-XL) 50 MG 24 hr tablet TAKE 1 TABLET BY MOUTH EVERY DAY                 Hypothyroidism due to  Hashimoto's thyroiditis    Overview     The patient presents with hashiomotos. She went to an endocrinologist in the past and he told her she did not need the thyroid medicine at that time. She has been off of it for a long time.    Lab Results   Component Value Date    TSH 1.657 2013              Obesity, morbid, BMI 40.0-49.9    Overview     The patient presents with obesity.  Denies bulimia, amenorrhea, cold intolerance, edema, hip pain, hirsutism, knee pain, polydipsia, polyuria, thirst and weakness.  The patient does not perform regular exercise.  Previous treatments for obesity :self-directed dieting without success.  The patient and I discussed the importance of exercise.    Wt Readings from Last 4 Encounters:   20 117 kg (258 lb)   19 117.3 kg (258 lb 9.6 oz)   11/15/18 120.1 kg (264 lb 12.4 oz)   18 114.3 kg (251 lb 15.8 oz)                        Other Visit Diagnoses     Annual physical exam    -  Primary          Past Medical History:  Past Medical History:   Diagnosis Date    Anemia     Anxiety     Fibrocystic breast     Hypertension     Infertility, female     Insulin resistance     Lichen sclerosus     Molar pregnancy     Multinodular goiter 2012    Thyroid disease     Ulcer      Past Surgical History:   Procedure Laterality Date     SECTION      x2    CHOLECYSTECTOMY      COLONOSCOPY W/ POLYPECTOMY  4/3/2012    DILATION AND CURETTAGE OF UTERUS      x2    ENDOMETRIAL ABLATION      UT EGD, FLEX, W/BIOPSY, SGL/MULTI  4/3/2012         TUBAL LIGATION       Review of patient's allergies indicates:   Allergen Reactions    Shrimp Rash     Current Outpatient Medications on File Prior to Visit   Medication Sig Dispense Refill    alprazolam (XANAX) 0.5 MG tablet   2    fluticasone (FLONASE) 50 mcg/actuation nasal spray 2 sprays by Each Nare route once daily. 16 g 0    hydrochlorothiazide (HYDRODIURIL) 12.5 MG Tab Take 12.5 mg by mouth.      liraglutide  0.6 mg/0.1 mL, 18 mg/3 mL, subq PNIJ (VICTOZA 2-BRADEN) 0.6 mg/0.1 mL (18 mg/3 mL) PnIj Inject 0.6 mg into the skin.      metFORMIN (GLUCOPHAGE-XR) 500 MG 24 hr tablet Take 2 tablets (1,000 mg total) by mouth daily with supper  11    metoprolol succinate (TOPROL-XL) 50 MG 24 hr tablet TAKE 1 TABLET BY MOUTH EVERY DAY 30 tablet 11    omeprazole (PRILOSEC) 40 MG capsule TAKE 1 CAPSULE BY MOUTH EVERY MORNING 30 capsule 0    [DISCONTINUED] DULoxetine (CYMBALTA) 60 MG capsule Take 120 mg by mouth once daily.   6    clobetasol 0.05% (TEMOVATE) 0.05 % Oint Apply topically daily as needed. For vulvar irritation 60 g 3    [DISCONTINUED] brompheniramin-phenylephrin-DM (ALA-HIST DM) 4-7.5-15 mg/5 mL Liqd TAKE ONE TEASPOONFUL BY MOUTH EVERY 4 HOURS (Patient not taking: Reported on 6/1/2020) 200 mL 1    [DISCONTINUED] ciprofloxacin HCl (CIPRO) 500 MG tablet Take 1 tablet (500 mg total) by mouth 2 (two) times daily for 14 doses  0    [DISCONTINUED] diclofenac sodium (VOLTAREN) 1 % Gel apply 4 grams to affected area 4 times daily as needed  11    [DISCONTINUED] fluconazole (DIFLUCAN) 150 MG Tab TAKE 1 TABLET BY MOUTH IN ONE DOSE      [DISCONTINUED] methen-m.blue-s.phos-phsal-hyo (URELLE) 81-10.8-40.8 mg Tab Take 1 tablet by mouth.      [DISCONTINUED] nitrofurantoin, macrocrystal-monohydrate, (MACROBID) 100 MG capsule Take 100 mg by mouth once daily.  0    [DISCONTINUED] SITagliptan-metformin 50-1,000 mg TM24 Take 1 tablet by mouth.       No current facility-administered medications on file prior to visit.      Social History     Socioeconomic History    Marital status:      Spouse name: Not on file    Number of children: Not on file    Years of education: Not on file    Highest education level: Not on file   Occupational History    Not on file   Social Needs    Financial resource strain: Not on file    Food insecurity:     Worry: Not on file     Inability: Not on file    Transportation needs:     Medical:  "Not on file     Non-medical: Not on file   Tobacco Use    Smoking status: Never Smoker    Smokeless tobacco: Never Used   Substance and Sexual Activity    Alcohol use: No    Drug use: No    Sexual activity: Yes     Partners: Male     Birth control/protection: Surgical   Lifestyle    Physical activity:     Days per week: Not on file     Minutes per session: Not on file    Stress: Not on file   Relationships    Social connections:     Talks on phone: Not on file     Gets together: Not on file     Attends Mu-ism service: Not on file     Active member of club or organization: Not on file     Attends meetings of clubs or organizations: Not on file     Relationship status: Not on file   Other Topics Concern    Not on file   Social History Narrative    Not on file     Family History   Problem Relation Age of Onset    Cancer Maternal Grandfather         cancer    Colon cancer Maternal Grandfather     Cancer Paternal Grandfather     Colon polyps Mother     Breast cancer Neg Hx     Ovarian cancer Neg Hx        Review of Systems   Constitutional: Positive for fatigue. Negative for fever and unexpected weight change.   HENT: Negative for congestion, ear pain, postnasal drip and sore throat.    Eyes: Negative for visual disturbance.   Respiratory: Negative for cough, chest tightness, shortness of breath and wheezing.    Cardiovascular: Negative for chest pain, palpitations and leg swelling.   Gastrointestinal: Negative for abdominal pain, blood in stool, constipation, diarrhea, nausea and vomiting.   Genitourinary: Negative for dysuria and hematuria.   Musculoskeletal: Positive for back pain.   Neurological: Negative for weakness and numbness.   Psychiatric/Behavioral: Positive for agitation and dysphoric mood.       Objective:     /82   Pulse 92   Temp 98 °F (36.7 °C)   Ht 5' 2" (1.575 m)   Wt 117 kg (258 lb)   BMI 47.19 kg/m²     Physical Exam   Constitutional: She is oriented to person, place, " and time. She appears well-developed and well-nourished. She is cooperative.   HENT:   Head: Normocephalic and atraumatic.   Right Ear: Tympanic membrane, external ear and ear canal normal.   Left Ear: Tympanic membrane, external ear and ear canal normal.   Nose: Nose normal.   Mouth/Throat: Uvula is midline and mucous membranes are normal. No oral lesions. No oropharyngeal exudate, posterior oropharyngeal edema or posterior oropharyngeal erythema.   Eyes: Pupils are equal, round, and reactive to light. EOM and lids are normal. Right eye exhibits no discharge. Left eye exhibits no discharge. Right conjunctiva is not injected. Right conjunctiva has no hemorrhage. Left conjunctiva is not injected. Left conjunctiva has no hemorrhage. No scleral icterus. Right eye exhibits no nystagmus. Left eye exhibits no nystagmus.   Neck: Normal range of motion and full passive range of motion without pain. Neck supple. No JVD present. No tracheal tenderness present. Carotid bruit is not present. No tracheal deviation present. No thyroid mass and no thyromegaly present.   Cardiovascular: Normal rate, regular rhythm, S1 normal and S2 normal.   No murmur heard.  Pulses:       Carotid pulses are 2+ on the right side, and 2+ on the left side.       Radial pulses are 2+ on the right side, and 2+ on the left side.        Posterior tibial pulses are 2+ on the right side, and 2+ on the left side.   Pulmonary/Chest: Effort normal and breath sounds normal. No respiratory distress. She has no wheezes. She has no rhonchi. She has no rales.   Abdominal: Soft. Normal appearance, normal aorta and bowel sounds are normal. She exhibits no distension, no abdominal bruit, no pulsatile midline mass and no mass. There is no hepatosplenomegaly. There is no tenderness. There is no rebound.   Musculoskeletal:        Right knee: She exhibits no swelling. No tenderness found.        Left knee: She exhibits no swelling. No tenderness found.        Lumbar  back: She exhibits tenderness, pain and spasm. She exhibits normal range of motion, no bony tenderness, no swelling, no edema, no deformity and no laceration.   Lymphadenopathy:        Head (right side): No submental and no submandibular adenopathy present.        Head (left side): No submental and no submandibular adenopathy present.     She has no cervical adenopathy.   Neurological: She is alert and oriented to person, place, and time. She has normal strength. She displays no atrophy and no tremor. No cranial nerve deficit or sensory deficit. She exhibits normal muscle tone.   Reflex Scores:       Patellar reflexes are 2+ on the right side and 2+ on the left side.       Achilles reflexes are 2+ on the right side and 2+ on the left side.  Skin: Skin is warm and dry. No rash noted. No cyanosis. Nails show no clubbing.   Psychiatric: She has a normal mood and affect. Her speech is normal and behavior is normal. Thought content normal. Cognition and memory are normal.     Assessment:     1. Anxiety    2. Chronic bilateral low back pain without sciatica    3. Hypertension, unspecified type    4. Hypothyroidism due to Hashimoto's thyroiditis    5. Obesity, morbid, BMI 40.0-49.9    6. Family history of colonic polyps    7. Immunization due    8. Encounter for screening mammogram for breast cancer        Plan:     Problem List Items Addressed This Visit     Anxiety - Primary    Relevant Medications    escitalopram oxalate (LEXAPRO) 10 MG tablet    DULoxetine (CYMBALTA) 30 MG capsule    Chronic bilateral low back pain without sciatica    Relevant Medications    meloxicam (MOBIC) 15 MG tablet    Other Relevant Orders    Ambulatory referral/consult to Physical/Occupational Therapy    Family history of colonic polyps    Relevant Medications    sodium,potassium,mag sulfates (SUPREP BOWEL PREP KIT) 17.5-3.13-1.6 gram SolR    Other Relevant Orders    Case request GI: COLONOSCOPY (Completed)    COVID-19 Routine Screening     Hypertension    Relevant Orders    Comprehensive metabolic panel    Lipid Panel    CBC auto differential    Hypothyroidism due to Hashimoto's thyroiditis    Relevant Orders    TSH    Obesity, morbid, BMI 40.0-49.9      Other Visit Diagnoses     Immunization due        Relevant Orders    Tdap Vaccine    Encounter for screening mammogram for breast cancer        Relevant Orders    Mammo Digital Screening Bilat        No follow-ups on file.      I have discontinued Radha Davila's brompheniramin-phenylephrin-DM, SITagliptan-metformin, ciprofloxacin HCl, diclofenac sodium, DULoxetine, fluconazole, nitrofurantoin (macrocrystal-monohydrate), and methen-m.blue-s.phos-phsal-hyo. I am also having her start on escitalopram oxalate, DULoxetine, (sodium,potassium,mag sulfates), and meloxicam. Additionally, I am having her maintain her metoprolol succinate, ALPRAZolam, fluticasone propionate, omeprazole, hydroCHLOROthiazide, clobetasol 0.05%, liraglutide 0.6 mg/0.1 mL (18 mg/3 mL) subq PNIJ, and metFORMIN.    Radha was seen today for other misc.    Diagnoses and all orders for this visit:    Anxiety  -     escitalopram oxalate (LEXAPRO) 10 MG tablet; Take 1 tablet (10 mg total) by mouth once daily.  -     DULoxetine (CYMBALTA) 30 MG capsule; Take 3 po d x 1 week then 2 a day for 1 week then 1 a day for 1 week then 1 every other day for 1 week then stop.    Chronic bilateral low back pain without sciatica  -     Ambulatory referral/consult to Physical/Occupational Therapy; Future  -     meloxicam (MOBIC) 15 MG tablet; Take 1 tablet (15 mg total) by mouth once daily.    Hypertension, unspecified type  -     Comprehensive metabolic panel; Future  -     Lipid Panel; Future  -     CBC auto differential; Future    Hypothyroidism due to Hashimoto's thyroiditis  -     TSH; Future    Obesity, morbid, BMI 40.0-49.9    Family history of colonic polyps  -     Case request GI: COLONOSCOPY  -     COVID-19 Routine Screening; Future  -      sodium,potassium,mag sulfates (SUPREP BOWEL PREP KIT) 17.5-3.13-1.6 gram SolR; Take as instructed on prep sheet    Immunization due  -     Tdap Vaccine    Encounter for screening mammogram for breast cancer  -     Mammo Digital Screening Bilat; Future         The patient was instructed to stop the following meds:  Medications Discontinued During This Encounter   Medication Reason    ciprofloxacin HCl (CIPRO) 500 MG tablet     methen-mWillablue-s.phos-phsal-hyo (URELLE) 81-10.8-40.8 mg Tab     nitrofurantoin, macrocrystal-monohydrate, (MACROBID) 100 MG capsule     SITagliptan-metformin 50-1,000 mg TM24     brompheniramin-phenylephrin-DM (ALA-HIST DM) 4-7.5-15 mg/5 mL Liqd     diclofenac sodium (VOLTAREN) 1 % Gel     fluconazole (DIFLUCAN) 150 MG Tab     DULoxetine (CYMBALTA) 60 MG capsule      Orders Placed This Encounter   Procedures    Mammo Digital Screening Bilat     Standing Status:   Future     Standing Expiration Date:   6/1/2021     Order Specific Question:   Reason for Exam:     Answer:   screening     Order Specific Question:   Is the patient pregnant?     Answer:   No    Tdap Vaccine     Administer a Tdap vaccine to the patient.    Comprehensive metabolic panel     Standing Status:   Future     Standing Expiration Date:   6/1/2021    Lipid Panel     Standing Status:   Future     Standing Expiration Date:   6/1/2021    TSH     Standing Status:   Future     Standing Expiration Date:   6/1/2021    CBC auto differential     Standing Status:   Future     Standing Expiration Date:   7/31/2021    COVID-19 Routine Screening     DO THIS TEST 2 DAYS PRIOR TO SCHEDULED EGD OR COLONOSCOPY     Standing Status:   Future     Standing Expiration Date:   7/31/2021     Order Specific Question:   Is the patient symptomatic?     Answer:   Yes    Ambulatory referral/consult to Physical/Occupational Therapy     Standing Status:   Future     Standing Expiration Date:   7/1/2021     Referral Priority:   Routine      Referral Type:   Physical Medicine     Referral Reason:   Specialty Services Required     Referred to Provider:   [facility]     Requested Specialty:   Physical Therapy     Number of Visits Requested:   1    Case request GI: COLONOSCOPY     Order Specific Question:   Pre-op Diagnosis     Answer:   Colon cancer screening [[id number]]     Order Specific Question:   CPT Code:     Answer:   [place] COLORECTAL CANCER SCREEN RESULTS DOCUMENT/REVIEW [3017F]     Order Specific Question:   Case Referring Provider     Answer:   ROSA DIAZ [[id number]]     Order Specific Question:   CPT Code:     Answer:   [place] COLORECTAL SCRN; HI RISK IND []     Order Specific Question:   Medical Necessity:     Answer:   Medically Non-Urgent [100]     Order Specific Question:   Is an on-site pathologist required for this procedure?     Answer:   N/A

## 2020-06-01 NOTE — Clinical Note
Pita, this patient wants to have her scope in 6/22.  I placed the order yesterday.  When I go into [employer]board, I can't find her order.  Can you help me by placing her on the schedule and sending her instructions on time, covid testing, etc. The covid and prep have been entered.

## 2020-06-02 ENCOUNTER — TELEPHONE (OUTPATIENT)
Dept: ENDOSCOPY | Facility: HOSPITAL | Age: 50
End: 2020-06-02

## 2020-06-03 ENCOUNTER — LAB VISIT (OUTPATIENT)
Dept: LAB | Facility: HOSPITAL | Age: 50
End: 2020-06-03
Attending: FAMILY MEDICINE
Payer: COMMERCIAL

## 2020-06-03 DIAGNOSIS — I10 HYPERTENSION, UNSPECIFIED TYPE: ICD-10-CM

## 2020-06-03 DIAGNOSIS — E03.8 HYPOTHYROIDISM DUE TO HASHIMOTO'S THYROIDITIS: ICD-10-CM

## 2020-06-03 DIAGNOSIS — E66.01 OBESITY, MORBID, BMI 40.0-49.9: ICD-10-CM

## 2020-06-03 DIAGNOSIS — E06.3 HYPOTHYROIDISM DUE TO HASHIMOTO'S THYROIDITIS: ICD-10-CM

## 2020-06-03 LAB
ALBUMIN SERPL BCP-MCNC: 3.9 G/DL (ref 3.5–5.2)
ALP SERPL-CCNC: 126 U/L (ref 55–135)
ALT SERPL W/O P-5'-P-CCNC: 20 U/L (ref 10–44)
ANION GAP SERPL CALC-SCNC: 11 MMOL/L (ref 8–16)
AST SERPL-CCNC: 18 U/L (ref 10–40)
BASOPHILS # BLD AUTO: 0.1 K/UL (ref 0–0.2)
BASOPHILS NFR BLD: 1.1 % (ref 0–1.9)
BILIRUB SERPL-MCNC: 0.2 MG/DL (ref 0.1–1)
BUN SERPL-MCNC: 9 MG/DL (ref 6–20)
CALCIUM SERPL-MCNC: 10.4 MG/DL (ref 8.7–10.5)
CHLORIDE SERPL-SCNC: 102 MMOL/L (ref 95–110)
CHOLEST SERPL-MCNC: 228 MG/DL (ref 120–199)
CHOLEST/HDLC SERPL: 5.8 {RATIO} (ref 2–5)
CO2 SERPL-SCNC: 25 MMOL/L (ref 23–29)
CREAT SERPL-MCNC: 0.8 MG/DL (ref 0.5–1.4)
DIFFERENTIAL METHOD: ABNORMAL
EOSINOPHIL # BLD AUTO: 0.4 K/UL (ref 0–0.5)
EOSINOPHIL NFR BLD: 4.3 % (ref 0–8)
ERYTHROCYTE [DISTWIDTH] IN BLOOD BY AUTOMATED COUNT: 13.1 % (ref 11.5–14.5)
EST. GFR  (AFRICAN AMERICAN): >60 ML/MIN/1.73 M^2
EST. GFR  (NON AFRICAN AMERICAN): >60 ML/MIN/1.73 M^2
GLUCOSE SERPL-MCNC: 131 MG/DL (ref 70–110)
HCT VFR BLD AUTO: 46.2 % (ref 37–48.5)
HDLC SERPL-MCNC: 39 MG/DL (ref 40–75)
HDLC SERPL: 17.1 % (ref 20–50)
HGB BLD-MCNC: 14.5 G/DL (ref 12–16)
IMM GRANULOCYTES # BLD AUTO: 0.02 K/UL (ref 0–0.04)
IMM GRANULOCYTES NFR BLD AUTO: 0.2 % (ref 0–0.5)
LDLC SERPL CALC-MCNC: 132.2 MG/DL (ref 63–159)
LYMPHOCYTES # BLD AUTO: 2.8 K/UL (ref 1–4.8)
LYMPHOCYTES NFR BLD: 30.4 % (ref 18–48)
MCH RBC QN AUTO: 28.4 PG (ref 27–31)
MCHC RBC AUTO-ENTMCNC: 31.4 G/DL (ref 32–36)
MCV RBC AUTO: 91 FL (ref 82–98)
MONOCYTES # BLD AUTO: 0.7 K/UL (ref 0.3–1)
MONOCYTES NFR BLD: 7.9 % (ref 4–15)
NEUTROPHILS # BLD AUTO: 5.1 K/UL (ref 1.8–7.7)
NEUTROPHILS NFR BLD: 56.1 % (ref 38–73)
NONHDLC SERPL-MCNC: 189 MG/DL
NRBC BLD-RTO: 0 /100 WBC
PLATELET # BLD AUTO: 336 K/UL (ref 150–350)
PMV BLD AUTO: 11.6 FL (ref 9.2–12.9)
POTASSIUM SERPL-SCNC: 4.2 MMOL/L (ref 3.5–5.1)
PROT SERPL-MCNC: 7.8 G/DL (ref 6–8.4)
RBC # BLD AUTO: 5.1 M/UL (ref 4–5.4)
SODIUM SERPL-SCNC: 138 MMOL/L (ref 136–145)
TRIGL SERPL-MCNC: 284 MG/DL (ref 30–150)
TSH SERPL DL<=0.005 MIU/L-ACNC: 2.56 UIU/ML (ref 0.4–4)
WBC # BLD AUTO: 9.11 K/UL (ref 3.9–12.7)

## 2020-06-03 PROCEDURE — 80061 LIPID PANEL: CPT

## 2020-06-03 PROCEDURE — 84443 ASSAY THYROID STIM HORMONE: CPT

## 2020-06-03 PROCEDURE — 80053 COMPREHEN METABOLIC PANEL: CPT

## 2020-06-03 PROCEDURE — 36415 COLL VENOUS BLD VENIPUNCTURE: CPT | Mod: PO

## 2020-06-03 PROCEDURE — 85025 COMPLETE CBC W/AUTO DIFF WBC: CPT

## 2020-06-04 DIAGNOSIS — R73.9 HYPERGLYCEMIA: Primary | ICD-10-CM

## 2020-06-04 NOTE — PROGRESS NOTES
I have reviewed this patient's recent labs and due to this, the following orders AND/OR meds have been sent in.  Please notify the patient so that the patient can be informed about my recommendations.      Orders Placed This Encounter   Procedures    Glucose Tolerance 2 Hour     Standing Status:   Future     Standing Expiration Date:   8/3/2021    Hemoglobin A1C     Standing Status:   Future     Standing Expiration Date:   6/5/2021    Insulin, random     Standing Status:   Future     Standing Expiration Date:   6/5/2021

## 2020-06-04 NOTE — PROGRESS NOTES
The thyroid function is normal as evidence by a normal TSH.    The patient has a high fasting glucose and it may be a borderline diabetic.  Please schedule the patient for a 2 hour glucose challenge along with a hemoglobin A1c.  Schedule the patient to followup with me 1 week after the testing is done.    The cholesterol is high but would not be treated at this point unless if diabetes is found and then we target it to a lower level and we would start treatment at that time.  We need to have the sugar work up done.    I have reviewed this patient's recent labs and due to this, the following orders AND/OR meds have been sent in.  Please notify the patient so that the patient can be informed about my recommendations.      Orders Placed This Encounter  Procedures   Glucose Tolerance 2 Hour    Standing Status:   Future    Standing Expiration Date:   8/3/2021   Hemoglobin A1C    Standing Status:   Future    Standing Expiration Date:   6/5/2021   Insulin, random    Standing Status:   Future    Standing Expiration Date:   6/5/2021

## 2020-06-08 ENCOUNTER — LAB VISIT (OUTPATIENT)
Dept: LAB | Facility: HOSPITAL | Age: 50
End: 2020-06-08
Attending: FAMILY MEDICINE
Payer: COMMERCIAL

## 2020-06-08 DIAGNOSIS — R73.9 HYPERGLYCEMIA: ICD-10-CM

## 2020-06-08 LAB
ESTIMATED AVG GLUCOSE: 157 MG/DL (ref 68–131)
GLUCOSE SERPL-MCNC: 144 MG/DL (ref 70–110)
GLUCOSE SERPL-MCNC: 263 MG/DL
GLUCOSE SERPL-MCNC: 278 MG/DL
HBA1C MFR BLD HPLC: 7.1 % (ref 4–5.6)
INSULIN COLLECTION INTERVAL: ABNORMAL
INSULIN SERPL-ACNC: 37.6 UU/ML

## 2020-06-08 PROCEDURE — 83036 HEMOGLOBIN GLYCOSYLATED A1C: CPT

## 2020-06-08 PROCEDURE — 82951 GLUCOSE TOLERANCE TEST (GTT): CPT

## 2020-06-08 PROCEDURE — 83525 ASSAY OF INSULIN: CPT

## 2020-06-08 PROCEDURE — 36415 COLL VENOUS BLD VENIPUNCTURE: CPT | Mod: PO

## 2020-06-09 NOTE — PROGRESS NOTES
These labs fit the criteria for the patient you to be considered a new onset diabetic.  Please schedule an appointment with me to discuss what we need to do going forward and develop a treatment plan with medication.

## 2020-06-10 ENCOUNTER — OFFICE VISIT (OUTPATIENT)
Dept: FAMILY MEDICINE | Facility: CLINIC | Age: 50
End: 2020-06-10
Payer: COMMERCIAL

## 2020-06-10 VITALS
SYSTOLIC BLOOD PRESSURE: 118 MMHG | BODY MASS INDEX: 47.84 KG/M2 | HEIGHT: 62 IN | TEMPERATURE: 98 F | DIASTOLIC BLOOD PRESSURE: 66 MMHG | WEIGHT: 260 LBS | HEART RATE: 81 BPM

## 2020-06-10 DIAGNOSIS — E11.649 UNCONTROLLED TYPE 2 DIABETES MELLITUS WITH HYPOGLYCEMIA, UNSPECIFIED HYPOGLYCEMIA COMA STATUS: Primary | ICD-10-CM

## 2020-06-10 PROCEDURE — 3078F PR MOST RECENT DIASTOLIC BLOOD PRESSURE < 80 MM HG: ICD-10-PCS | Mod: CPTII,S$GLB,, | Performed by: NURSE PRACTITIONER

## 2020-06-10 PROCEDURE — 99214 PR OFFICE/OUTPT VISIT, EST, LEVL IV, 30-39 MIN: ICD-10-PCS | Mod: S$GLB,,, | Performed by: NURSE PRACTITIONER

## 2020-06-10 PROCEDURE — 3074F SYST BP LT 130 MM HG: CPT | Mod: CPTII,S$GLB,, | Performed by: NURSE PRACTITIONER

## 2020-06-10 PROCEDURE — 3008F BODY MASS INDEX DOCD: CPT | Mod: CPTII,S$GLB,, | Performed by: NURSE PRACTITIONER

## 2020-06-10 PROCEDURE — 3008F PR BODY MASS INDEX (BMI) DOCUMENTED: ICD-10-PCS | Mod: CPTII,S$GLB,, | Performed by: NURSE PRACTITIONER

## 2020-06-10 PROCEDURE — 3074F PR MOST RECENT SYSTOLIC BLOOD PRESSURE < 130 MM HG: ICD-10-PCS | Mod: CPTII,S$GLB,, | Performed by: NURSE PRACTITIONER

## 2020-06-10 PROCEDURE — 3078F DIAST BP <80 MM HG: CPT | Mod: CPTII,S$GLB,, | Performed by: NURSE PRACTITIONER

## 2020-06-10 PROCEDURE — 99999 PR PBB SHADOW E&M-EST. PATIENT-LVL IV: ICD-10-PCS | Mod: PBBFAC,,, | Performed by: NURSE PRACTITIONER

## 2020-06-10 PROCEDURE — 3051F PR MOST RECENT HEMOGLOBIN A1C LEVEL 7.0 - < 8.0%: ICD-10-PCS | Mod: CPTII,S$GLB,, | Performed by: NURSE PRACTITIONER

## 2020-06-10 PROCEDURE — 3051F HG A1C>EQUAL 7.0%<8.0%: CPT | Mod: CPTII,S$GLB,, | Performed by: NURSE PRACTITIONER

## 2020-06-10 PROCEDURE — 99999 PR PBB SHADOW E&M-EST. PATIENT-LVL IV: CPT | Mod: PBBFAC,,, | Performed by: NURSE PRACTITIONER

## 2020-06-10 PROCEDURE — 99214 OFFICE O/P EST MOD 30 MIN: CPT | Mod: S$GLB,,, | Performed by: NURSE PRACTITIONER

## 2020-06-10 RX ORDER — DEXBROMPHENIRAMINE MALEATE, DEXTROMETHORPHAN HYDROBROMIDE AND PHENYLEPHRINE HYDROCHLORIDE 2; 15; 7.5 MG/5ML; MG/5ML; MG/5ML
LIQUID ORAL
COMMUNITY
Start: 2020-02-03

## 2020-06-10 RX ORDER — CIPROFLOXACIN AND DEXAMETHASONE 3; 1 MG/ML; MG/ML
4 SUSPENSION/ DROPS AURICULAR (OTIC) 2 TIMES DAILY
COMMUNITY
Start: 2020-06-03

## 2020-06-10 RX ORDER — INSULIN PUMP SYRINGE, 3 ML
EACH MISCELLANEOUS
Qty: 1 EACH | Refills: 0 | Status: SHIPPED | OUTPATIENT
Start: 2020-06-10 | End: 2021-06-10

## 2020-06-10 NOTE — PROGRESS NOTES
Subjective:       Patient ID: Radha Davila is a 49 y.o. female.    Chief Complaint: Diabetes    Diabetes  She presents for her initial diabetic visit. She has type 2 diabetes mellitus. There are no hypoglycemic associated symptoms. Pertinent negatives for hypoglycemia include no dizziness, headaches or nervousness/anxiousness. Associated symptoms include fatigue and visual change. Pertinent negatives for diabetes include no chest pain. There are no hypoglycemic complications. Symptoms are stable. There are no diabetic complications. Risk factors for coronary artery disease include diabetes mellitus and hypertension. When asked about current treatments, none were reported. Her weight is stable. She is following a generally unhealthy diet. An ACE inhibitor/angiotensin II receptor blocker is not being taken.       Review of Systems   Constitutional: Positive for fatigue. Negative for fever and unexpected weight change.   HENT: Negative for ear pain and sore throat.    Eyes: Negative for pain and visual disturbance.   Respiratory: Negative for cough and shortness of breath.    Cardiovascular: Negative for chest pain and palpitations.   Gastrointestinal: Negative for abdominal pain, diarrhea and vomiting.   Musculoskeletal: Negative for arthralgias and myalgias.   Skin: Negative for color change and rash.   Neurological: Negative for dizziness and headaches.   Psychiatric/Behavioral: Negative for dysphoric mood and sleep disturbance. The patient is not nervous/anxious.        Vitals:    06/10/20 1314   BP: 118/66   Pulse: 81   Temp: 98 °F (36.7 °C)       Objective:     Current Outpatient Medications   Medication Sig Dispense Refill    alprazolam (XANAX) 0.5 MG tablet   2    ciprofloxacin-dexamethasone 0.3-0.1% (CIPRODEX) 0.3-0.1 % DrpS Place 4 drops into both ears 2 (two) times a day.      dexbrompheniramine-phenylep-DM 2-7.5-15 mg/5 mL Liqd as needed.      DULoxetine (CYMBALTA) 30 MG capsule Take 3 po d x 1  week then 2 a day for 1 week then 1 a day for 1 week then 1 every other day for 1 week then stop. 45 capsule 0    escitalopram oxalate (LEXAPRO) 10 MG tablet Take 1 tablet (10 mg total) by mouth once daily. 30 tablet 0    fluticasone (FLONASE) 50 mcg/actuation nasal spray 2 sprays by Each Nare route once daily. 16 g 0    hydrochlorothiazide (HYDRODIURIL) 12.5 MG Tab Take 12.5 mg by mouth.      meloxicam (MOBIC) 15 MG tablet Take 1 tablet (15 mg total) by mouth once daily. 30 tablet 0    metFORMIN (GLUCOPHAGE-XR) 500 MG 24 hr tablet Take 2 tablets (1,000 mg total) by mouth daily with supper  11    metoprolol succinate (TOPROL-XL) 50 MG 24 hr tablet TAKE 1 TABLET BY MOUTH EVERY DAY 30 tablet 11    omeprazole (PRILOSEC) 40 MG capsule TAKE 1 CAPSULE BY MOUTH EVERY MORNING 30 capsule 0    sodium,potassium,mag sulfates (SUPREP BOWEL PREP KIT) 17.5-3.13-1.6 gram SolR Take as instructed on prep sheet 354 mL 0    blood sugar diagnostic (BLOOD GLUCOSE TEST) Strp 1 each by Misc.(Non-Drug; Combo Route) route once daily. 100 each 5    blood-glucose meter kit Use as instructed 1 each 0    clobetasol 0.05% (TEMOVATE) 0.05 % Oint Apply topically daily as needed. For vulvar irritation 60 g 3    dulaglutide (TRULICITY) 1.5 mg/0.5 mL PnIj Inject 1.5 mg into the skin every 7 days. 4 Syringe 5     No current facility-administered medications for this visit.        Physical Exam  Vitals signs and nursing note reviewed.   Constitutional:       General: She is not in acute distress.     Appearance: She is well-developed and well-nourished.   HENT:      Head: Normocephalic and atraumatic.   Eyes:      Extraocular Movements: EOM normal.      Pupils: Pupils are equal, round, and reactive to light.   Neck:      Musculoskeletal: Normal range of motion and neck supple.   Cardiovascular:      Rate and Rhythm: Normal rate and regular rhythm.      Pulses:           Dorsalis pedis pulses are 2+ on the right side and 2+ on the left side.    Pulmonary:      Effort: Pulmonary effort is normal.      Breath sounds: Normal breath sounds.   Musculoskeletal: Normal range of motion.      Right foot: No deformity.      Left foot: No deformity.   Feet:      Right foot:      Protective Sensation: 4 sites tested. 4 sites sensed.      Skin integrity: No ulcer, blister, skin breakdown or erythema.      Left foot:      Protective Sensation: 4 sites tested. 4 sites sensed.      Skin integrity: No ulcer, blister, skin breakdown or erythema.   Skin:     General: Skin is warm and dry.      Findings: No rash.   Neurological:      Mental Status: She is alert and oriented to person, place, and time.   Psychiatric:         Mood and Affect: Mood and affect normal.         Judgment: Judgment normal.         Lab Results   Component Value Date    HGBA1C 7.1 (H) 06/08/2020     Assessment:       1. Uncontrolled type 2 diabetes mellitus with hypoglycemia, unspecified hypoglycemia coma status        Plan:   Uncontrolled type 2 diabetes mellitus with hypoglycemia, unspecified hypoglycemia coma status  -     blood-glucose meter kit; Use as instructed  Dispense: 1 each; Refill: 0  -     Microalbumin/creatinine urine ratio; Future; Expected date: 06/17/2020    Other orders  -     dulaglutide (TRULICITY) 1.5 mg/0.5 mL PnIj; Inject 1.5 mg into the skin every 7 days.  Dispense: 4 Syringe; Refill: 5  -     blood sugar diagnostic (BLOOD GLUCOSE TEST) Strp; 1 each by Misc.(Non-Drug; Combo Route) route once daily.  Dispense: 100 each; Refill: 5     I spent 35 min discussing plan of care and treatment options with this patient.  We discussed importance of monitoring glucose, when to monitor glucose, and how frequently.  We discussed importance of following the diabetic diet.  Carb counting, the plate method.  She was given a packet of diabetic diet information.  She is going to download my fitness pal rick on to her phone  We also discussed the importance of cardiovascular activity, weight  loss  She was instructed that she is going to need a diabetic eye exam    She will return with her glucometer in 6 weeks    Follow up in about 6 weeks (around 7/22/2020).    There are no Patient Instructions on file for this visit.

## 2020-06-19 ENCOUNTER — CLINICAL SUPPORT (OUTPATIENT)
Dept: FAMILY MEDICINE | Facility: CLINIC | Age: 50
End: 2020-06-19
Payer: COMMERCIAL

## 2020-06-19 ENCOUNTER — TELEPHONE (OUTPATIENT)
Dept: ENDOSCOPY | Facility: HOSPITAL | Age: 50
End: 2020-06-19

## 2020-06-19 DIAGNOSIS — Z83.719 FAMILY HISTORY OF COLONIC POLYPS: ICD-10-CM

## 2020-06-19 DIAGNOSIS — U07.1 2019 NOVEL CORONAVIRUS DISEASE (COVID-19): Primary | ICD-10-CM

## 2020-06-19 PROCEDURE — U0003 INFECTIOUS AGENT DETECTION BY NUCLEIC ACID (DNA OR RNA); SEVERE ACUTE RESPIRATORY SYNDROME CORONAVIRUS 2 (SARS-COV-2) (CORONAVIRUS DISEASE [COVID-19]), AMPLIFIED PROBE TECHNIQUE, MAKING USE OF HIGH THROUGHPUT TECHNOLOGIES AS DESCRIBED BY CMS-2020-01-R: HCPCS

## 2020-06-19 NOTE — TELEPHONE ENCOUNTER
----- Message from Laurence Arroyo sent at 6/19/2020 12:51 PM CDT -----  States she has a colonoscopy on Monday and she needs to cancel. Please call pt [phone]. Thank you

## 2020-06-19 NOTE — PROGRESS NOTES
Patient arrived for COVID-19 pre procedure screening, patient was swabbed and specimen was sent to the lab for processing.

## 2020-06-20 LAB — SARS-COV-2 RNA RESP QL NAA+PROBE: NOT DETECTED

## 2020-07-26 PROBLEM — E11.69 COMBINED HYPERLIPIDEMIA ASSOCIATED WITH TYPE 2 DIABETES MELLITUS: Status: ACTIVE | Noted: 2020-07-26

## 2020-07-26 PROBLEM — Z86.010 HISTORY OF COLON POLYPS: Status: RESOLVED | Noted: 2017-07-24 | Resolved: 2020-07-26

## 2020-07-26 PROBLEM — E78.2 COMBINED HYPERLIPIDEMIA ASSOCIATED WITH TYPE 2 DIABETES MELLITUS: Status: ACTIVE | Noted: 2020-07-26

## 2020-09-01 ENCOUNTER — TELEPHONE (OUTPATIENT)
Dept: ADMINISTRATIVE | Facility: HOSPITAL | Age: 50
End: 2020-09-01

## 2020-09-02 ENCOUNTER — HOSPITAL ENCOUNTER (OUTPATIENT)
Dept: RADIOLOGY | Facility: HOSPITAL | Age: 50
Discharge: HOME | End: 2020-09-02
Attending: FAMILY MEDICINE
Payer: COMMERCIAL

## 2020-09-02 VITALS — HEIGHT: 62 IN | WEIGHT: 259.94 LBS | BODY MASS INDEX: 47.84 KG/M2

## 2020-09-02 DIAGNOSIS — Z12.31 ENCOUNTER FOR SCREENING MAMMOGRAM FOR BREAST CANCER: ICD-10-CM

## 2020-09-02 PROCEDURE — 77067 MAMMO DIGITAL SCREENING BILAT WITH TOMOSYNTHESIS_CAD: ICD-10-PCS | Mod: 26,,, | Performed by: RADIOLOGY

## 2020-09-02 PROCEDURE — 77063 BREAST TOMOSYNTHESIS BI: CPT | Mod: 26,,, | Performed by: RADIOLOGY

## 2020-09-02 PROCEDURE — 77067 SCR MAMMO BI INCL CAD: CPT | Mod: 26,,, | Performed by: RADIOLOGY

## 2020-09-02 PROCEDURE — 77067 SCR MAMMO BI INCL CAD: CPT | Mod: TC,PO

## 2020-09-02 PROCEDURE — 77063 MAMMO DIGITAL SCREENING BILAT WITH TOMOSYNTHESIS_CAD: ICD-10-PCS | Mod: 26,,, | Performed by: RADIOLOGY

## 2020-09-04 ENCOUNTER — PATIENT OUTREACH (OUTPATIENT)
Dept: ADMINISTRATIVE | Facility: HOSPITAL | Age: 50
End: 2020-09-04

## 2020-09-29 ENCOUNTER — TELEPHONE (OUTPATIENT)
Dept: ENDOSCOPY | Facility: HOSPITAL | Age: 50
End: 2020-09-29

## 2020-09-29 NOTE — TELEPHONE ENCOUNTER
Called patient to schedule colonoscopy. No answer left voicemail with call back number for patient to return call and schedule.

## 2020-10-06 ENCOUNTER — PATIENT MESSAGE (OUTPATIENT)
Dept: ADMINISTRATIVE | Facility: HOSPITAL | Age: 50
End: 2020-10-06

## 2020-11-25 ENCOUNTER — PATIENT MESSAGE (OUTPATIENT)
Dept: FAMILY MEDICINE | Facility: CLINIC | Age: 50
End: 2020-11-25

## 2020-12-02 ENCOUNTER — TELEPHONE (OUTPATIENT)
Dept: FAMILY MEDICINE | Facility: CLINIC | Age: 50
End: 2020-12-02

## 2020-12-02 NOTE — TELEPHONE ENCOUNTER
The patient never schedule her colonoscopy and the order is over 6-month-old so it has been canceled.  Ask her if she wants me to place another order and have it scheduled for her or not.

## 2020-12-02 NOTE — TELEPHONE ENCOUNTER
"----- Message from Carey eRed RN sent at 12/2/2020  9:32 AM CST -----  Regarding: Cancellation of Order # [id number]  Order number [id number] for the procedure CASE REQUEST ENDOSCOPY   [GI5] has been canceled by Carey Reed RN [[id number]]. This   procedure was ordered by Tiago Nieto MD [[id number]] on Jun 1, 2020 for the patient Radha Davila [[id number]]. The reason for  cancellation was "None".  "

## 2020-12-09 ENCOUNTER — PATIENT MESSAGE (OUTPATIENT)
Dept: FAMILY MEDICINE | Facility: CLINIC | Age: 50
End: 2020-12-09

## 2020-12-09 DIAGNOSIS — Z01.818 PREOP EXAMINATION: ICD-10-CM

## 2020-12-09 DIAGNOSIS — Z12.11 COLON CANCER SCREENING: Primary | ICD-10-CM

## 2020-12-10 RX ORDER — SODIUM, POTASSIUM,MAG SULFATES 17.5-3.13G
SOLUTION, RECONSTITUTED, ORAL ORAL
Qty: 354 ML | Refills: 0 | Status: SHIPPED | OUTPATIENT
Start: 2020-12-10

## 2021-03-26 ENCOUNTER — TELEPHONE (OUTPATIENT)
Dept: ENDOSCOPY | Facility: HOSPITAL | Age: 51
End: 2021-03-26

## 2021-05-08 ENCOUNTER — PATIENT MESSAGE (OUTPATIENT)
Dept: ENDOSCOPY | Facility: HOSPITAL | Age: 51
End: 2021-05-08

## 2021-05-10 ENCOUNTER — PATIENT MESSAGE (OUTPATIENT)
Dept: RESEARCH | Facility: HOSPITAL | Age: 51
End: 2021-05-10

## 2021-06-14 ENCOUNTER — TELEPHONE (OUTPATIENT)
Dept: FAMILY MEDICINE | Facility: CLINIC | Age: 51
End: 2021-06-14

## 2021-07-15 ENCOUNTER — PATIENT OUTREACH (OUTPATIENT)
Dept: ADMINISTRATIVE | Facility: HOSPITAL | Age: 51
End: 2021-07-15